# Patient Record
Sex: MALE | Race: BLACK OR AFRICAN AMERICAN | Employment: FULL TIME | URBAN - METROPOLITAN AREA
[De-identification: names, ages, dates, MRNs, and addresses within clinical notes are randomized per-mention and may not be internally consistent; named-entity substitution may affect disease eponyms.]

---

## 2017-03-14 ENCOUNTER — OFFICE VISIT (OUTPATIENT)
Dept: INTERNAL MEDICINE CLINIC | Age: 52
End: 2017-03-14

## 2017-03-14 VITALS
WEIGHT: 297 LBS | HEIGHT: 72 IN | DIASTOLIC BLOOD PRESSURE: 80 MMHG | BODY MASS INDEX: 40.23 KG/M2 | SYSTOLIC BLOOD PRESSURE: 124 MMHG | RESPIRATION RATE: 16 BRPM | HEART RATE: 64 BPM

## 2017-03-14 DIAGNOSIS — Z11.59 NEED FOR HEPATITIS C SCREENING TEST: ICD-10-CM

## 2017-03-14 DIAGNOSIS — Z00.00 ANNUAL PHYSICAL EXAM: Primary | ICD-10-CM

## 2017-03-14 DIAGNOSIS — Z11.4 SCREENING FOR HIV (HUMAN IMMUNODEFICIENCY VIRUS): ICD-10-CM

## 2017-03-14 DIAGNOSIS — E66.01 MORBID OBESITY WITH BMI OF 40.0-44.9, ADULT (HCC): ICD-10-CM

## 2017-03-14 DIAGNOSIS — Z12.5 SCREENING FOR PROSTATE CANCER: ICD-10-CM

## 2017-03-14 DIAGNOSIS — R73.01 IMPAIRED FASTING GLUCOSE: ICD-10-CM

## 2017-03-14 LAB
A/G RATIO: 1.7 (ref 1.1–2.2)
ALBUMIN SERPL-MCNC: 4.5 G/DL (ref 3.4–5)
ALP BLD-CCNC: 73 U/L (ref 40–129)
ALT SERPL-CCNC: 20 U/L (ref 10–40)
ANION GAP SERPL CALCULATED.3IONS-SCNC: 14 MMOL/L (ref 3–16)
AST SERPL-CCNC: 14 U/L (ref 15–37)
BASOPHILS ABSOLUTE: 0 K/UL (ref 0–0.2)
BASOPHILS RELATIVE PERCENT: 0.7 %
BILIRUB SERPL-MCNC: 0.4 MG/DL (ref 0–1)
BUN BLDV-MCNC: 13 MG/DL (ref 7–20)
CALCIUM SERPL-MCNC: 9.6 MG/DL (ref 8.3–10.6)
CHLORIDE BLD-SCNC: 106 MMOL/L (ref 99–110)
CHOLESTEROL, TOTAL: 206 MG/DL (ref 0–199)
CO2: 25 MMOL/L (ref 21–32)
CREAT SERPL-MCNC: 1.2 MG/DL (ref 0.9–1.3)
EOSINOPHILS ABSOLUTE: 0.1 K/UL (ref 0–0.6)
EOSINOPHILS RELATIVE PERCENT: 1.5 %
GFR AFRICAN AMERICAN: >60
GFR NON-AFRICAN AMERICAN: >60
GLOBULIN: 2.6 G/DL
GLUCOSE BLD-MCNC: 109 MG/DL (ref 70–99)
HCT VFR BLD CALC: 47.5 % (ref 40.5–52.5)
HDLC SERPL-MCNC: 75 MG/DL (ref 40–60)
HEMOGLOBIN: 15.2 G/DL (ref 13.5–17.5)
HEPATITIS C ANTIBODY INTERPRETATION: NORMAL
LDL CHOLESTEROL CALCULATED: 116 MG/DL
LYMPHOCYTES ABSOLUTE: 1.5 K/UL (ref 1–5.1)
LYMPHOCYTES RELATIVE PERCENT: 35 %
MCH RBC QN AUTO: 26.3 PG (ref 26–34)
MCHC RBC AUTO-ENTMCNC: 32.1 G/DL (ref 31–36)
MCV RBC AUTO: 82.1 FL (ref 80–100)
MONOCYTES ABSOLUTE: 0.4 K/UL (ref 0–1.3)
MONOCYTES RELATIVE PERCENT: 10.4 %
NEUTROPHILS ABSOLUTE: 2.2 K/UL (ref 1.7–7.7)
NEUTROPHILS RELATIVE PERCENT: 52.4 %
PDW BLD-RTO: 15 % (ref 12.4–15.4)
PLATELET # BLD: 214 K/UL (ref 135–450)
PMV BLD AUTO: 8.1 FL (ref 5–10.5)
POTASSIUM SERPL-SCNC: 4.4 MMOL/L (ref 3.5–5.1)
PROSTATE SPECIFIC ANTIGEN: 0.85 NG/ML (ref 0–4)
RBC # BLD: 5.79 M/UL (ref 4.2–5.9)
SODIUM BLD-SCNC: 145 MMOL/L (ref 136–145)
TOTAL PROTEIN: 7.1 G/DL (ref 6.4–8.2)
TRIGL SERPL-MCNC: 74 MG/DL (ref 0–150)
TSH SERPL DL<=0.05 MIU/L-ACNC: 0.49 UIU/ML (ref 0.27–4.2)
VLDLC SERPL CALC-MCNC: 15 MG/DL
WBC # BLD: 4.2 K/UL (ref 4–11)

## 2017-03-14 PROCEDURE — 99396 PREV VISIT EST AGE 40-64: CPT | Performed by: INTERNAL MEDICINE

## 2017-03-14 PROCEDURE — 93000 ELECTROCARDIOGRAM COMPLETE: CPT | Performed by: INTERNAL MEDICINE

## 2017-03-15 LAB
ESTIMATED AVERAGE GLUCOSE: 134.1 MG/DL
HBA1C MFR BLD: 6.3 %
HIV-1 AND HIV-2 ANTIBODIES: NORMAL

## 2017-03-20 ENCOUNTER — TELEPHONE (OUTPATIENT)
Dept: INTERNAL MEDICINE CLINIC | Age: 52
End: 2017-03-20

## 2017-03-28 ENCOUNTER — TELEPHONE (OUTPATIENT)
Dept: INTERNAL MEDICINE CLINIC | Age: 52
End: 2017-03-28

## 2018-03-19 ENCOUNTER — OFFICE VISIT (OUTPATIENT)
Dept: INTERNAL MEDICINE CLINIC | Age: 53
End: 2018-03-19

## 2018-03-19 VITALS
HEIGHT: 71 IN | WEIGHT: 308 LBS | BODY MASS INDEX: 43.12 KG/M2 | DIASTOLIC BLOOD PRESSURE: 80 MMHG | RESPIRATION RATE: 12 BRPM | SYSTOLIC BLOOD PRESSURE: 120 MMHG | HEART RATE: 84 BPM

## 2018-03-19 DIAGNOSIS — Z82.49 FAMILY HISTORY OF EARLY CAD: ICD-10-CM

## 2018-03-19 DIAGNOSIS — R05.9 COUGH: ICD-10-CM

## 2018-03-19 DIAGNOSIS — Z00.00 ANNUAL PHYSICAL EXAM: Primary | ICD-10-CM

## 2018-03-19 DIAGNOSIS — Z13.6 SCREENING FOR CARDIOVASCULAR CONDITION: ICD-10-CM

## 2018-03-19 DIAGNOSIS — E66.01 MORBID OBESITY WITH BMI OF 40.0-44.9, ADULT (HCC): ICD-10-CM

## 2018-03-19 DIAGNOSIS — R73.01 IMPAIRED FASTING GLUCOSE: ICD-10-CM

## 2018-03-19 DIAGNOSIS — J45.998 POST-VIRAL REACTIVE AIRWAY DISEASE: ICD-10-CM

## 2018-03-19 DIAGNOSIS — E66.01 OBESITY, MORBID, BMI 40.0-49.9 (HCC): ICD-10-CM

## 2018-03-19 DIAGNOSIS — Z12.5 SCREENING FOR PROSTATE CANCER: ICD-10-CM

## 2018-03-19 LAB
A/G RATIO: 1.8 (ref 1.1–2.2)
ALBUMIN SERPL-MCNC: 4.6 G/DL (ref 3.4–5)
ALP BLD-CCNC: 69 U/L (ref 40–129)
ALT SERPL-CCNC: 24 U/L (ref 10–40)
ANION GAP SERPL CALCULATED.3IONS-SCNC: 15 MMOL/L (ref 3–16)
AST SERPL-CCNC: 22 U/L (ref 15–37)
BASOPHILS ABSOLUTE: 0 K/UL (ref 0–0.2)
BASOPHILS RELATIVE PERCENT: 0.6 %
BILIRUB SERPL-MCNC: 0.5 MG/DL (ref 0–1)
BILIRUBIN, POC: NORMAL
BLOOD URINE, POC: NORMAL
BUN BLDV-MCNC: 15 MG/DL (ref 7–20)
CALCIUM SERPL-MCNC: 9.5 MG/DL (ref 8.3–10.6)
CHLORIDE BLD-SCNC: 100 MMOL/L (ref 99–110)
CHOLESTEROL, TOTAL: 211 MG/DL (ref 0–199)
CLARITY, POC: CLEAR
CO2: 27 MMOL/L (ref 21–32)
COLOR, POC: YELLOW
CREAT SERPL-MCNC: 1.1 MG/DL (ref 0.9–1.3)
EOSINOPHILS ABSOLUTE: 0.1 K/UL (ref 0–0.6)
EOSINOPHILS RELATIVE PERCENT: 2.6 %
GFR AFRICAN AMERICAN: >60
GFR NON-AFRICAN AMERICAN: >60
GLOBULIN: 2.5 G/DL
GLUCOSE BLD-MCNC: 120 MG/DL (ref 70–99)
GLUCOSE URINE, POC: NORMAL
HCT VFR BLD CALC: 46.4 % (ref 40.5–52.5)
HDLC SERPL-MCNC: 74 MG/DL (ref 40–60)
HEMOGLOBIN: 15.1 G/DL (ref 13.5–17.5)
KETONES, POC: NORMAL
LDL CHOLESTEROL CALCULATED: 117 MG/DL
LEUKOCYTE EST, POC: NORMAL
LYMPHOCYTES ABSOLUTE: 1.2 K/UL (ref 1–5.1)
LYMPHOCYTES RELATIVE PERCENT: 30.1 %
MCH RBC QN AUTO: 26.3 PG (ref 26–34)
MCHC RBC AUTO-ENTMCNC: 32.5 G/DL (ref 31–36)
MCV RBC AUTO: 81.1 FL (ref 80–100)
MONOCYTES ABSOLUTE: 0.5 K/UL (ref 0–1.3)
MONOCYTES RELATIVE PERCENT: 13.1 %
NEUTROPHILS ABSOLUTE: 2.2 K/UL (ref 1.7–7.7)
NEUTROPHILS RELATIVE PERCENT: 53.6 %
NITRITE, POC: NORMAL
PDW BLD-RTO: 15 % (ref 12.4–15.4)
PH, POC: 5
PLATELET # BLD: 204 K/UL (ref 135–450)
PMV BLD AUTO: 7.9 FL (ref 5–10.5)
POTASSIUM SERPL-SCNC: 4.3 MMOL/L (ref 3.5–5.1)
PROSTATE SPECIFIC ANTIGEN: 1.03 NG/ML (ref 0–4)
PROTEIN, POC: NORMAL
RBC # BLD: 5.73 M/UL (ref 4.2–5.9)
SODIUM BLD-SCNC: 142 MMOL/L (ref 136–145)
SPECIFIC GRAVITY, POC: 1.02
TOTAL PROTEIN: 7.1 G/DL (ref 6.4–8.2)
TRIGL SERPL-MCNC: 101 MG/DL (ref 0–150)
TSH SERPL DL<=0.05 MIU/L-ACNC: 0.66 UIU/ML (ref 0.27–4.2)
UROBILINOGEN, POC: 0.2
VLDLC SERPL CALC-MCNC: 20 MG/DL
WBC # BLD: 4.2 K/UL (ref 4–11)

## 2018-03-19 PROCEDURE — 81002 URINALYSIS NONAUTO W/O SCOPE: CPT | Performed by: INTERNAL MEDICINE

## 2018-03-19 PROCEDURE — 93000 ELECTROCARDIOGRAM COMPLETE: CPT | Performed by: INTERNAL MEDICINE

## 2018-03-19 PROCEDURE — 99396 PREV VISIT EST AGE 40-64: CPT | Performed by: INTERNAL MEDICINE

## 2018-03-19 RX ORDER — FLUTICASONE FUROATE AND VILANTEROL 200; 25 UG/1; UG/1
1 POWDER RESPIRATORY (INHALATION) DAILY
Qty: 1 EACH | Refills: 0 | COMMUNITY
Start: 2018-03-19 | End: 2018-05-10 | Stop reason: ALTCHOICE

## 2018-03-19 RX ORDER — BENZONATATE 200 MG/1
200 CAPSULE ORAL 3 TIMES DAILY PRN
Qty: 30 CAPSULE | Refills: 0 | Status: SHIPPED | OUTPATIENT
Start: 2018-03-19 | End: 2018-05-10 | Stop reason: ALTCHOICE

## 2018-03-19 ASSESSMENT — PATIENT HEALTH QUESTIONNAIRE - PHQ9
SUM OF ALL RESPONSES TO PHQ QUESTIONS 1-9: 0
2. FEELING DOWN, DEPRESSED OR HOPELESS: 0
1. LITTLE INTEREST OR PLEASURE IN DOING THINGS: 0
SUM OF ALL RESPONSES TO PHQ9 QUESTIONS 1 & 2: 0

## 2018-03-19 NOTE — PROGRESS NOTES
2 - 2 Dose Series) 10/17/2014    A1C test (Diabetic or Prediabetic)  03/14/2018    Lipid screen  03/14/2022    DTaP/Tdap/Td vaccine (2 - Td) 01/29/2023    Colon cancer screen colonoscopy  04/15/2024    Flu vaccine  Completed    Hepatitis C screen  Completed    HIV screen  Completed      Self-testicular exams: Yes  Sexual activity: single partner, wife. Last eye exam: 12/2017, glasses. Exercise: Working out less, 3 days per week. DIET: Salad for lunch, \"sensisble dinner. \"    Seatbelt use: Yes  Sunscreen Use: Yes  Dentist:  Every 6 months    Physical Exam    /80   Pulse 84   Resp 12   Ht 5' 11\" (1.803 m)   Wt (!) 308 lb (139.7 kg)   BMI 42.96 kg/m²     Wt Readings from Last 3 Encounters:   03/19/18 (!) 308 lb (139.7 kg)   03/14/17 297 lb (134.7 kg)   02/09/16 296 lb (134.3 kg)       BP Readings from Last 3 Encounters:   03/19/18 120/80   03/14/17 124/80   02/09/16 124/90         GEN:  47 y.o. male who is in NAD, A&O. He appears stated age and well nourished. He is cooperative and pleasant. Morbidly obese  HEAD:  NC/AT, no lesions. EYES:  JERMAINE, EOMI, No scleral icterus or conjunctival injection or discharge. Visual fields in tact to confrontation. Fundoscopic (non-dilated) grossly normal.  Disc margins well demarcated. EARS:  EAC's clear, TM's normal.  NOSE:  Nasal cavity is clear. No mucosal congestion or discharge. Sinuses are nontender. MOUTH & THROAT:  Oral cavity is clear without mucosal lesions. Tongue is midline. Dentition is in good repair. No pharyngeal erythema or exudate. NECK:  Supple. Full ROM. Trachea is midline. No increased JVD. No thyromegaly or nodules. No masses  LYMPH: No C/SC/A/F nodes  CARDIAC:  S1S2 NL. Regular rhythm. No murmur/clicks/rubs. No ectopy. PMI is non-displaced. VASC:  Pedal pulses 2/4. Carotid upstrokes 2+. No bruits noted. PULM:  Lungs are CTA. Symmetric breath sounds noted. AP Diameter NL.   No wheezing or forced expiratory wheezing. GI:  Abdomen is soft and nontender. No distension. No organomegaly. No masses. No pulsatile masses. :   External genitalia NL. No testicular or scrotal masses. Penile shaft is NL.  BREAST:  No masses. EXT:  No Cyanosis or clubbing. No edema. SKIN: Warm and dry, normal turgor, + Psoriatic plaques BL knees, elbows  NEURO:  Cranial nerves 2-12 are NL. Speech fluent and coherent. Strength is 5/5 in all muscle groups. No sensory deficits. No focal or lateralizing deficits. Reflexes 2/4 and symmetric. Gait is normal.  MS:  No C/T/L paraspinal tenderness. No scoliosis. No joint effusions. Full joint ROM. PSYCH:  Mood and affect NL. Judgement and insight NL. Assessment/Plan:  Ned was seen today for annual exam.    Diagnoses and all orders for this visit:    Annual physical exam  -     EKG 12 Lead  -     POCT Urinalysis no Micro  -     CBC Auto Differential  -     Comprehensive Metabolic Panel  -     Lipid Panel  -     TSH without Reflex  -     Hemoglobin A1C  -     Psa screening    Impaired fasting glucose  -     Comprehensive Metabolic Panel  -     Hemoglobin A1C    Morbid obesity with BMI of 40.0-44.9, adult (HCC)    Family history of early CAD  -     CT Cardiac Calcium Scoring; Future    Screening for cardiovascular condition  -     CT Cardiac Calcium Scoring; Future    Cough  -     benzonatate (TESSALON) 200 MG capsule; Take 1 capsule by mouth 3 times daily as needed for Cough    Post-viral reactive airway disease  -     Fluticasone Furoate-Vilanterol (BREO ELLIPTA) 200-25 MCG/INH AEPB;  Inhale 1 puff into the lungs daily    Obesity, morbid, BMI 40.0-49.9 ([facility] [address]    Screening for prostate cancer  -     Psa screening           Preventive plan of care for Ayan Madison        3/19/2018           Preventive Measures Status       Recommendations for screening   Prostate Cancer Screen  Lab Results   Component Value Date    PSA 0.85 03/14/2017     Test recommended and ordered    Colon Cancer Screen  Last colonoscopy: 4/15/14  Repeat every 10 years-- Due 2024   Diabetes Screen  Glucose (mg/dL)   Date Value   03/14/2017 109 (H)    Test recommended and ordered   Cholesterol Screen  Lab Results   Component Value Date    CHOL 206 (H) 03/14/2017    TRIG 74 03/14/2017    HDL 75 (H) 03/14/2017    LDLCALC 116 (H) 03/14/2017    Test recommended and ordered   HIV screening recommended for those ages 12-76 NO MATTER THE RISK FOR HIV--  3/14/2017  No need to repeat at this time    Hepatitis C screening recommended for those born between [facility]-- 3/14/2017  No need to repeat at this time    Aspirin for Cardiovascular Prevention   Yes Continue Baby Aspirin Daily     Recommended Immunizations    Immunization History   Administered Date(s) Administered    Influenza Virus Vaccine 02/09/2016, 12/13/2017    Tdap (Boostrix, Adacel) 01/29/2013    Influenza vaccine: recommended every fall-Up to date    Pneumonia vaccine: due at age 72     Shingles vaccine: Shingrx due. Prescription provided. Get 1 shot and then repeat 2-6 months later    Tetanus vaccine: tetanus and diptheria vaccine (Td/Tdap) recommended every 10 years- Td due 2023              1. Use sunscreen daily to help reduce the risk of skin cancer. 2. Have an eye exam every 2 years to screen for glaucoma, macular degeneration, and cataracts  3. Always wear a seatbelt while in a car  4. Wear a helmet if riding a bike or motorcycle  5. Continue aggressive lifestyle modification focusing on carbohydrate restriction, low-fat food choices and portion control. Continue exercise program.   6. Obtain coronary calcium score for screening for cardiovascular disease. 7. Return to the office if cough persists    Here are a few  Reliable websites with a variety of health and wellness information:   www.mylifecheck. heart. org     www.nutritionsource. org     www. americanheart. org     www. diabetes. org     www.[facility]     www.[employer] ([facility])

## 2018-03-19 NOTE — PATIENT INSTRUCTIONS
Preventive plan of care for Jes Brody        3/19/2018           Preventive Measures Status       Recommendations for screening   Prostate Cancer Screen  Lab Results   Component Value Date    PSA 0.85 03/14/2017     Test recommended and ordered    Colon Cancer Screen  Last colonoscopy: 4/15/14  Repeat every 10 years-- Due 2024   Diabetes Screen  Glucose (mg/dL)   Date Value   03/14/2017 109 (H)    Test recommended and ordered   Cholesterol Screen  Lab Results   Component Value Date    CHOL 206 (H) 03/14/2017    TRIG 74 03/14/2017    HDL 75 (H) 03/14/2017    LDLCALC 116 (H) 03/14/2017    Test recommended and ordered   HIV screening recommended for those ages 12-76 NO MATTER THE RISK FOR HIV--  3/14/2017  No need to repeat at this time    Hepatitis C screening recommended for those born between [facility]-- 3/14/2017  No need to repeat at this time    Aspirin for Cardiovascular Prevention   Yes Continue Baby Aspirin Daily     Recommended Immunizations    Immunization History   Administered Date(s) Administered    Influenza Virus Vaccine 02/09/2016, 12/13/2017    Tdap (Boostrix, Adacel) 01/29/2013    Influenza vaccine: recommended every fall-Up to date    Pneumonia vaccine: due at age 72     Shingles vaccine: Shingrx due. Prescription provided. Get 1 shot and then repeat 2-6 months later    Tetanus vaccine: tetanus and diptheria vaccine (Td/Tdap) recommended every 10 years- Td due 2023              1. Use sunscreen daily to help reduce the risk of skin cancer. 2. Have an eye exam every 2 years to screen for glaucoma, macular degeneration, and cataracts  3. Always wear a seatbelt while in a car  4. Wear a helmet if riding a bike or motorcycle  5. Continue aggressive lifestyle modification focusing on carbohydrate restriction, low-fat food choices and portion control. Continue exercise program.   6. Obtain coronary calcium score for screening for cardiovascular disease.     7. Return to the office if cough persists      Here are a few  Reliable websites with a variety of health and wellness information:   www.mylifecheck. heart. org     www.nutritionsource. org     www. americanheart. org     www. diabetes. org     www.[facility]     www.[employer] ([address] [facility] site)            Patient Education        Cough: Care Instructions  Your Care Instructions    A cough is your body's response to something that bothers your throat or airways. Many things can cause a cough. You might cough because of a cold or the flu, bronchitis, or asthma. Smoking, postnasal drip, allergies, and stomach acid that backs up into your throat also can cause coughs. A cough is a symptom, not a disease. Most coughs stop when the cause, such as a cold, goes away. You can take a few steps at home to cough less and feel better. Follow-up care is a key part of your treatment and safety. Be sure to make and go to all appointments, and call your doctor if you are having problems. It's also a good idea to know your test results and keep a list of the medicines you take. How can you care for yourself at home? · Drink lots of water and other fluids. This helps thin the mucus and soothes a dry or sore throat. Honey or lemon juice in hot water or tea may ease a dry cough. · Take cough medicine as directed by your doctor. · Prop up your head on pillows to help you breathe and ease a dry cough. · Try cough drops to soothe a dry or sore throat. Cough drops don't stop a cough. Medicine-flavored cough drops are no better than candy-flavored drops or hard candy. · Do not smoke. Avoid secondhand smoke. If you need help quitting, talk to your doctor about stop-smoking programs and medicines. These can increase your chances of quitting for good. When should you call for help? Call 911 anytime you think you may need emergency care. For example, call if:  ? · You have severe trouble breathing.    ?Call your doctor now or seek immediate medical care if:  ? · You cough up blood. ? · You have new or worse trouble breathing. ? · You have a new or higher fever. ? · You have a new rash. ? Watch closely for changes in your health, and be sure to contact your doctor if:  ? · You cough more deeply or more often, especially if you notice more mucus or a change in the color of your mucus. ? · You have new symptoms, such as a sore throat, an earache, or sinus pain. ? · You do not get better as expected. Where can you learn more? Go to https://Senchapepiceweb.Blue Tiger Labs. org and sign in to your [employer] account. Enter D279 in the Wedding Spot box to learn more about \"Cough: Care Instructions. \"     If you do not have an account, please click on the \"Sign Up Now\" link. Current as of: May 12, 2017  Content Version: 11.5  © 9005-6604 [employer]. Care instructions adapted under license by [employer]. If you have questions about a medical condition or this instruction, always ask your healthcare professional. [address] any warranty or liability for your use of this information. Patient Education        Prediabetes: Care Instructions  Your Care Instructions    Prediabetes is a warning sign that you are at risk for getting type 2 diabetes. It means that your blood sugar is higher than it should be. The food you eat turns into sugar, which your body uses for energy. Normally, an organ called the pancreas makes insulin, which allows the sugar in your blood to get into your body's cells. But when your body can't use insulin the right way, the sugar doesn't move into cells. It stays in your blood instead. This is called insulin resistance. The buildup of sugar in the blood causes prediabetes. The good news is that lifestyle changes may help you get your blood sugar back to normal and help you avoid or delay diabetes. Follow-up care is a key part of your treatment and safety.  Be sure to make and go to all appointments, and call your doctor if you are having problems. It's also a good idea to know your test results and keep a list of the medicines you take. How can you care for yourself at home? · Watch your weight. A healthy weight helps your body use insulin properly. · Limit the amount of calories, sweets, and unhealthy fat you eat. Ask your doctor if you should see a dietitian. A registered dietitian can help you create meal plans that fit your lifestyle. · Get at least 30 minutes of exercise on most days of the week. Exercise helps control your blood sugar. It also helps you maintain a healthy weight. Walking is a good choice. You also may want to do other activities, such as running, swimming, cycling, or playing tennis or team sports. · Do not smoke. Smoking can make prediabetes worse. If you need help quitting, talk to your doctor about stop-smoking programs and medicines. These can increase your chances of quitting for good. · If your doctor prescribed medicines, take them exactly as prescribed. Call your doctor if you think you are having a problem with your medicine. You will get more details on the specific medicines your doctor prescribes. When should you call for help? Watch closely for changes in your health, and be sure to contact your doctor if:  ? · You have any symptoms of diabetes. These may include:  ¨ Being thirsty more often. ¨ Urinating more. ¨ Being hungrier. ¨ Losing weight. ¨ Being very tired. ¨ Having blurry vision. ? · You have a wound that will not heal.   ? · You have an infection that will not go away. ? · You have problems with your blood pressure. ? · You want more information about diabetes and how you can keep from getting it. Where can you learn more? Go to https://nghia.Software 2000. org and sign in to your [employer] account. Enter I222 in the Equidate box to learn more about \"Prediabetes: Care Instructions. \"     If you do not have an account, please click on the \"Sign Up Now\" link. Current as of: March 13, 2017  Content Version: 11.5  © [id number] [employer]. Care instructions adapted under license by [facility] ([facility]). If you have questions about a medical condition or this instruction, always ask your healthcare professional. [address] any warranty or liability for your use of this information. Patient Education        Learning About Low-Carbohydrate Diets for Weight Loss  What is a low-carbohydrate diet? Low-carb diets avoid foods that are high in carbohydrate. These high-carb foods include pasta, bread, rice, cereal, fruits, and starchy vegetables. Instead, these diets usually have you eat foods that are high in fat and protein. Many people lose weight quickly on a low-carb diet. But the early weight loss is water. People on this diet often gain the weight back after they start eating carbs again. Not all diet plans are safe or work well. A lot of the evidence shows that low-carb diets aren't healthy. That's because these diets often don't include healthy foods like fruits and vegetables. Losing weight safely means balancing protein, fat, and carbs with every meal and snack. And low-carb diets don't always provide the vitamins, minerals, and fiber you need. If you have a serious medical condition, talk to your doctor before you try any diet. These conditions include kidney disease, heart disease, type 2 diabetes, high cholesterol, and high blood pressure. If you are pregnant, it may not be safe for your baby if you are on a low-carb diet. How can you lose weight safely? You might have heard that a diet plan helped another person lose weight. But that doesn't mean that it will work for you. It is very hard to stay on a diet that includes lots of big changes in your eating habits.  If you want to get to a healthy weight and stay there, making healthy lifestyle changes will often work better than dieting. These steps can help. · Make a plan for change. Work with your doctor to create a plan that is right for you. · See a dietitian. He or she can show you how to make healthy changes in your eating habits. · Manage stress. If you have a lot of stress in your life, it can be hard to focus on making healthy changes to your daily habits. · Track your food and activity. You are likely to do better at losing weight if you keep track of what you eat and what you do. Follow-up care is a key part of your treatment and safety. Be sure to make and go to all appointments, and call your doctor if you are having problems. It's also a good idea to know your test results and keep a list of the medicines you take. Where can you learn more? Go to https://Fusepoint Managed Servicesjesenia.Evotec. org and sign in to your [employer] account. Enter A121 in the [employer] box to learn more about \"Learning About Low-Carbohydrate Diets for Weight Loss. \"     If you do not have an account, please click on the \"Sign Up Now\" link. Current as of: May 12, 2017  Content Version: 11.5  © 7214-4530 [employer]. Care instructions adapted under license by [facility] ([facility]). If you have questions about a medical condition or this instruction, always ask your healthcare professional. [address] any warranty or liability for your use of this information. Patient Education        Learning About Coronary Artery Disease (CAD)  What is coronary artery disease? Coronary artery disease (CAD) occurs when plaque builds up in the arteries that bring oxygen-rich blood to your heart. Plaque is a fatty substance made of cholesterol, calcium, and other substances in the blood. This process is called hardening of the arteries, or atherosclerosis. What happens when you have coronary artery disease? · Plaque may narrow the coronary arteries. Narrowed arteries cause poor blood flow.  This can lead to angina symptoms colon cancer at age 48. You may have one of several tests. Your doctor will tell you how often to have tests based on your age and risk. Risks include whether you already had a precancerous polyp removed from your colon or whether your parent, brother, sister, or child has had colon cancer. · Heart attack and stroke risk. At least every 4 to 6 years, you should have your risk for heart attack and stroke assessed. Your doctor uses factors such as your age, blood pressure, cholesterol, and whether you smoke or have diabetes to show what your risk for a heart attack or stroke is over the next 10 years. · Abdominal aortic aneurysm. Ask your doctor whether you should have a test to check for an aneurysm. You may need a test if you ever smoked or if your parent, brother, sister, or child has had an aneurysm. When should you call for help? Watch closely for changes in your health, and be sure to contact your doctor if you have any problems or symptoms that concern you. Where can you learn more? Go to https://Quantenna Communications.Summitour. org and sign in to your [employer] account. Enter H970 in the Solazyme box to learn more about \"Well Visit, Men 48 to 72: Care Instructions. \"     If you do not have an account, please click on the \"Sign Up Now\" link. Current as of: Eileen 10, 2017  Content Version: 11.5  © 9296-8095 Healthwise, Incorporated. Care instructions adapted under license by [facility] ([facility]). If you have questions about a medical condition or this instruction, always ask your healthcare professional. [place] [address] any warranty or liability for your use of this information. Patient Education        Starting a Weight Loss Plan: Care Instructions  Your Care Instructions    If you are thinking about losing weight, it can be hard to know where to start. Your doctor can help you set up a weight loss plan that best meets your needs.  You may want to take a class on nutrition or

## 2018-03-20 ENCOUNTER — TELEPHONE (OUTPATIENT)
Dept: INTERNAL MEDICINE CLINIC | Age: 53
End: 2018-03-20

## 2018-03-20 LAB
ESTIMATED AVERAGE GLUCOSE: 157.1 MG/DL
HBA1C MFR BLD: 7.1 %

## 2018-04-28 ENCOUNTER — HOSPITAL ENCOUNTER (OUTPATIENT)
Dept: CT IMAGING | Age: 53
Discharge: HOME | End: 2018-04-28
Attending: INTERNAL MEDICINE | Admitting: INTERNAL MEDICINE

## 2018-04-28 DIAGNOSIS — Z13.6 SCREENING FOR CARDIOVASCULAR CONDITION: ICD-10-CM

## 2018-04-28 DIAGNOSIS — Z82.49 FAMILY HISTORY OF ISCHEMIC HEART DISEASE AND OTHER DISEASES OF THE CIRCULATORY SYSTEM: ICD-10-CM

## 2018-04-28 DIAGNOSIS — Z82.49 FAMILY HISTORY OF EARLY CAD: ICD-10-CM

## 2018-05-10 ENCOUNTER — OFFICE VISIT (OUTPATIENT)
Dept: INTERNAL MEDICINE CLINIC | Age: 53
End: 2018-05-10

## 2018-05-10 VITALS
DIASTOLIC BLOOD PRESSURE: 84 MMHG | SYSTOLIC BLOOD PRESSURE: 138 MMHG | BODY MASS INDEX: 43.1 KG/M2 | WEIGHT: 309 LBS | HEART RATE: 78 BPM

## 2018-05-10 DIAGNOSIS — Z23 NEED FOR PNEUMOCOCCAL VACCINATION: ICD-10-CM

## 2018-05-10 DIAGNOSIS — E11.9 TYPE 2 DIABETES MELLITUS WITHOUT COMPLICATION, WITHOUT LONG-TERM CURRENT USE OF INSULIN (HCC): Primary | ICD-10-CM

## 2018-05-10 LAB — GLUCOSE BLD-MCNC: 112 MG/DL

## 2018-05-10 PROCEDURE — 82962 GLUCOSE BLOOD TEST: CPT | Performed by: INTERNAL MEDICINE

## 2018-05-10 PROCEDURE — 90732 PPSV23 VACC 2 YRS+ SUBQ/IM: CPT | Performed by: INTERNAL MEDICINE

## 2018-05-10 PROCEDURE — 99213 OFFICE O/P EST LOW 20 MIN: CPT | Performed by: INTERNAL MEDICINE

## 2018-05-10 PROCEDURE — 90471 IMMUNIZATION ADMIN: CPT | Performed by: INTERNAL MEDICINE

## 2018-05-11 LAB
CREATININE URINE: 100 MG/DL (ref 39–259)
MICROALBUMIN UR-MCNC: <1.2 MG/DL
MICROALBUMIN/CREAT UR-RTO: NORMAL MG/G (ref 0–30)

## 2018-06-01 ENCOUNTER — OFFICE VISIT (OUTPATIENT)
Dept: INTERNAL MEDICINE CLINIC | Age: 53
End: 2018-06-01

## 2018-06-01 VITALS
SYSTOLIC BLOOD PRESSURE: 120 MMHG | HEART RATE: 84 BPM | WEIGHT: 304 LBS | RESPIRATION RATE: 16 BRPM | DIASTOLIC BLOOD PRESSURE: 80 MMHG | BODY MASS INDEX: 42.4 KG/M2

## 2018-06-01 DIAGNOSIS — E11.9 TYPE 2 DIABETES MELLITUS WITHOUT COMPLICATION, WITHOUT LONG-TERM CURRENT USE OF INSULIN (HCC): Primary | ICD-10-CM

## 2018-06-01 PROCEDURE — 99213 OFFICE O/P EST LOW 20 MIN: CPT | Performed by: INTERNAL MEDICINE

## 2018-06-21 ENCOUNTER — CARE COORDINATION (OUTPATIENT)
Dept: INTERNAL MEDICINE CLINIC | Age: 53
End: 2018-06-21

## 2018-07-06 ENCOUNTER — OFFICE VISIT (OUTPATIENT)
Dept: INTERNAL MEDICINE CLINIC | Age: 53
End: 2018-07-06

## 2018-07-06 VITALS
BODY MASS INDEX: 42.96 KG/M2 | SYSTOLIC BLOOD PRESSURE: 138 MMHG | DIASTOLIC BLOOD PRESSURE: 80 MMHG | WEIGHT: 308 LBS | RESPIRATION RATE: 16 BRPM | HEART RATE: 72 BPM

## 2018-07-06 DIAGNOSIS — Z23 NEED FOR SHINGLES VACCINE: ICD-10-CM

## 2018-07-06 DIAGNOSIS — E11.9 TYPE 2 DIABETES MELLITUS WITHOUT COMPLICATION, WITHOUT LONG-TERM CURRENT USE OF INSULIN (HCC): Primary | ICD-10-CM

## 2018-07-06 DIAGNOSIS — E66.01 MORBID OBESITY WITH BMI OF 40.0-44.9, ADULT (HCC): ICD-10-CM

## 2018-07-06 DIAGNOSIS — E78.5 HYPERLIPIDEMIA, UNSPECIFIED HYPERLIPIDEMIA TYPE: ICD-10-CM

## 2018-07-06 LAB
A/G RATIO: 1.7 (ref 1.1–2.2)
ALBUMIN SERPL-MCNC: 4.5 G/DL (ref 3.4–5)
ALP BLD-CCNC: 70 U/L (ref 40–129)
ALT SERPL-CCNC: 17 U/L (ref 10–40)
ANION GAP SERPL CALCULATED.3IONS-SCNC: 14 MMOL/L (ref 3–16)
AST SERPL-CCNC: 14 U/L (ref 15–37)
BILIRUB SERPL-MCNC: 0.3 MG/DL (ref 0–1)
BUN BLDV-MCNC: 16 MG/DL (ref 7–20)
CALCIUM SERPL-MCNC: 9.9 MG/DL (ref 8.3–10.6)
CHLORIDE BLD-SCNC: 105 MMOL/L (ref 99–110)
CHOLESTEROL, TOTAL: 195 MG/DL (ref 0–199)
CO2: 26 MMOL/L (ref 21–32)
CREAT SERPL-MCNC: 1.3 MG/DL (ref 0.9–1.3)
GFR AFRICAN AMERICAN: >60
GFR NON-AFRICAN AMERICAN: 58
GLOBULIN: 2.7 G/DL
GLUCOSE BLD-MCNC: 115 MG/DL (ref 70–99)
HDLC SERPL-MCNC: 72 MG/DL (ref 40–60)
LDL CHOLESTEROL CALCULATED: 107 MG/DL
POTASSIUM SERPL-SCNC: 4.2 MMOL/L (ref 3.5–5.1)
SODIUM BLD-SCNC: 145 MMOL/L (ref 136–145)
TOTAL PROTEIN: 7.2 G/DL (ref 6.4–8.2)
TRIGL SERPL-MCNC: 79 MG/DL (ref 0–150)
VLDLC SERPL CALC-MCNC: 16 MG/DL

## 2018-07-06 PROCEDURE — 99214 OFFICE O/P EST MOD 30 MIN: CPT | Performed by: INTERNAL MEDICINE

## 2018-07-06 NOTE — PROGRESS NOTES
[facility]  Internal Medicine  Patient Encounter  Candi Miranda D.O., [facility]        Chief Complaint   Patient presents with    Follow-up       HPI: 47 y.o. male seen today for routine check up regarding chronic medical problems and med review. Diabetes Mellitus Type II, Follow-up--   Lab Results   Component Value Date    LABA1C 7.1 03/19/2018      Lab Results   Component Value Date    .1 03/19/2018     He feels his sugars are good. Home sugars reviewed--excellent. He has been making some diet changes. He eliminated juices. He has not lost any weight. Last Eye Exam: Scheduled for August.   U.Microalbumin/Cr:  5/10/2018   Pt is not on ACEI or ARB. Pt denies foot ulcerations, increase appetite, nausea, paresthesia of the feet, polydipsia, polyuria and visual disturbances. Insulin Treated? No.    ASA: Yes. Tobacco: No     Hyperlipidemia:    Lab Results   Component Value Date    LDLCALC 117 (H) 03/19/2018   He has not yet started exercising. He does not eat a lot of fatty foods, but cheated over the July 4th holiday. Morbid obesity-- He has not lost weight. He is starting to watch his diet. Not much exercise. Past Medical History:   Diagnosis Date    HNP (herniated nucleus pulposus), cervical 2009    Dr. Corbin Hernadez Impaired fasting glucose     Psoriasis     Dr. Phuong Bosch    Type 2 diabetes mellitus without complication, without long-term current use of [facility] 5/10/2018         MEDICATIONS:  Prior to Visit Medications    Medication Sig Taking? Authorizing Provider   zoster recombinant adjuvanted vaccine (SHINGRIX) 50 MCG SUSR injection Inject 0.5 mLs into the muscle once for 1 dose . Repeat in 2-6 months Yes Walker Barron, DO   aspirin EC 81 MG EC tablet Take 1 tablet by mouth daily. Yes Candi Miranda, DO           Review of Systems - As per HPI  GEN: Pt denies fever, chills or night sweats. Denies weight changes.   Appetite good  HEENT: Pt loss and sustained weight loss. 4. Need for shingles vaccine    - zoster recombinant adjuvanted vaccine (SHINGRIX) 50 MCG SUSR injection; Inject 0.5 mLs into the muscle once for 1 dose . Repeat in 2-6 months  Dispense: 0.5 mL; Refill: 1        Discussed medications with patient who voiced understanding of their use, indication and potential side effects. Pt also understands the above recommendations. All questions answered.

## 2018-07-06 NOTE — PATIENT INSTRUCTIONS
when cooking. · Don't skip meals. Your blood sugar may drop too low if you skip meals and take insulin or certain medicines for diabetes. · Check with your doctor before you drink alcohol. Alcohol can cause your blood sugar to drop too low. Alcohol can also cause a bad reaction if you take certain diabetes medicines. Follow-up care is a key part of your treatment and safety. Be sure to make and go to all appointments, and call your doctor if you are having problems. It's also a good idea to know your test results and keep a list of the medicines you take. Where can you learn more? Go to https://chpepiceweb.[email or web address]. org and sign in to your Ctrip account. Enter B460 in the Outsmart box to learn more about \"Learning About Diabetes Food Guidelines. \"     If you do not have an account, please click on the \"Sign Up Now\" link. Current as of: December 7, 2017  Content Version: 11.6  © 3093-4256 [employer]. Care instructions adapted under license by [facility] ([facility]). If you have questions about a medical condition or this instruction, always ask your healthcare professional. [address] any warranty or liability for your use of this information. Patient Education        Learning About Meal Planning for Diabetes  Why plan your meals? Meal planning can be a key part of managing diabetes. Planning meals and snacks with the right balance of carbohydrate, protein, and fat can help you keep your blood sugar at the target level you set with your doctor. You don't have to eat special foods. You can eat what your family eats, including sweets once in a while. But you do have to pay attention to how often you eat and how much you eat of certain foods. You may want to work with a dietitian or a certified diabetes educator. He or she can give you tips and meal ideas and can answer your questions about meal planning.  This health professional can also help you reach blood sugar levels. A registered dietitian or diabetes educator can help you plan how much carbohydrate to include in each meal and snack. A guideline for your daily amount of carbohydrate is:  · 45 to 60 grams at each meal. That's about the same as 3 to 4 carbohydrate servings. · 15 to 20 grams at each snack. That's about the same as 1 carbohydrate serving. The Nutrition Facts label on packaged foods tells you how much carbohydrate is in a serving of the food. First, look at the serving size on the food label. Is that the amount you eat in a serving? All of the nutrition information on a food label is based on that serving size. So if you eat more or less than that, you'll need to adjust the other numbers. Total carbohydrate is the next thing you need to look for on the label. If you count carbohydrate servings, one serving of carbohydrate is 15 grams. For foods that don't come with labels, such as fresh fruits and vegetables, you'll need a guide that lists carbohydrate in these foods. Ask your doctor, dietitian, or diabetes educator about books or other nutrition guides you can use. If you take insulin, you need to know how many grams of carbohydrate are in a meal. This lets you know how much rapid-acting insulin to take before you eat. If you use an insulin pump, you get a constant rate of insulin during the day. So the pump must be programmed at meals to give you extra insulin to cover the rise in blood sugar after meals. When you know how much carbohydrate you will eat, you can take the right amount of insulin. Or, if you always use the same amount of insulin, you need to make sure that you eat the same amount of carbohydrate at meals. If you need more help to understand carbohydrate counting and food labels, ask your doctor, dietitian, or diabetes educator. How do you get started with meal planning? Here are some tips to get started:  · Plan your meals a week at a time.  Don't forget to include snacks make and go to all appointments, and call your doctor if you are having problems. It's also a good idea to know your test results and keep a list of the medicines you take. Where can you learn more? Go to https://chjesenia.Kitchenbug. org and sign in to your Options Away account. Enter B306 in the Matomy Money box to learn more about \"Learning About High Cholesterol. \"     If you do not have an account, please click on the \"Sign Up Now\" link. Current as of: May 10, 2017  Content Version: 11.6  © 4816-6109 [employer]. Care instructions adapted under license by [facility] ([facility]). If you have questions about a medical condition or this instruction, always ask your healthcare professional. [address] any warranty or liability for your use of this information.

## 2018-07-07 LAB
ESTIMATED AVERAGE GLUCOSE: 139.9 MG/DL
HBA1C MFR BLD: 6.5 %

## 2018-07-10 ENCOUNTER — TELEPHONE (OUTPATIENT)
Dept: INTERNAL MEDICINE CLINIC | Age: 53
End: 2018-07-10

## 2018-07-10 NOTE — TELEPHONE ENCOUNTER
Patient states he is returning a call to the office regarding his lab results. Per Dr. Edward Yoon note, I informed patient of the following:    Notes recorded by Blima Apley, DO on 7/7/2018 at 9:23 AM EDT  Notify pt lab tests look good.  Sugar average is better. Juli Lay making changes and working hard with lifestyle modification    Patient voiced understanding and has No additional questions.     Phone Encounter Completed unless you need patient for something additional.

## 2018-10-15 ENCOUNTER — OFFICE VISIT (OUTPATIENT)
Dept: INTERNAL MEDICINE CLINIC | Age: 53
End: 2018-10-15
Payer: COMMERCIAL

## 2018-10-15 VITALS
SYSTOLIC BLOOD PRESSURE: 120 MMHG | DIASTOLIC BLOOD PRESSURE: 82 MMHG | HEART RATE: 92 BPM | HEIGHT: 71 IN | RESPIRATION RATE: 16 BRPM | WEIGHT: 309 LBS | OXYGEN SATURATION: 95 % | BODY MASS INDEX: 43.26 KG/M2

## 2018-10-15 DIAGNOSIS — E66.01 MORBID OBESITY WITH BMI OF 40.0-44.9, ADULT (HCC): ICD-10-CM

## 2018-10-15 DIAGNOSIS — R05.9 COUGH: ICD-10-CM

## 2018-10-15 DIAGNOSIS — E11.9 TYPE 2 DIABETES MELLITUS WITHOUT COMPLICATION, WITHOUT LONG-TERM CURRENT USE OF INSULIN (HCC): ICD-10-CM

## 2018-10-15 DIAGNOSIS — E11.9 TYPE 2 DIABETES MELLITUS WITHOUT COMPLICATION, WITHOUT LONG-TERM CURRENT USE OF INSULIN (HCC): Primary | ICD-10-CM

## 2018-10-15 DIAGNOSIS — E78.5 HYPERLIPIDEMIA, UNSPECIFIED HYPERLIPIDEMIA TYPE: ICD-10-CM

## 2018-10-15 DIAGNOSIS — J30.2 SEASONAL ALLERGIC RHINITIS, UNSPECIFIED TRIGGER: ICD-10-CM

## 2018-10-15 DIAGNOSIS — Z23 FLU VACCINE NEED: ICD-10-CM

## 2018-10-15 LAB
A/G RATIO: 1.6 (ref 1.1–2.2)
ALBUMIN SERPL-MCNC: 4.6 G/DL (ref 3.4–5)
ALP BLD-CCNC: 77 U/L (ref 40–129)
ALT SERPL-CCNC: 23 U/L (ref 10–40)
ANION GAP SERPL CALCULATED.3IONS-SCNC: 17 MMOL/L (ref 3–16)
AST SERPL-CCNC: 21 U/L (ref 15–37)
BILIRUB SERPL-MCNC: 0.5 MG/DL (ref 0–1)
BUN BLDV-MCNC: 15 MG/DL (ref 7–20)
CALCIUM SERPL-MCNC: 9.7 MG/DL (ref 8.3–10.6)
CHLORIDE BLD-SCNC: 103 MMOL/L (ref 99–110)
CHOLESTEROL, TOTAL: 183 MG/DL (ref 0–199)
CO2: 25 MMOL/L (ref 21–32)
CREAT SERPL-MCNC: 1.3 MG/DL (ref 0.9–1.3)
GFR AFRICAN AMERICAN: >60
GFR NON-AFRICAN AMERICAN: 58
GLOBULIN: 2.8 G/DL
GLUCOSE BLD-MCNC: 119 MG/DL (ref 70–99)
HDLC SERPL-MCNC: 70 MG/DL (ref 40–60)
LDL CHOLESTEROL CALCULATED: 98 MG/DL
POTASSIUM SERPL-SCNC: 4.4 MMOL/L (ref 3.5–5.1)
SODIUM BLD-SCNC: 145 MMOL/L (ref 136–145)
TOTAL PROTEIN: 7.4 G/DL (ref 6.4–8.2)
TRIGL SERPL-MCNC: 74 MG/DL (ref 0–150)
VLDLC SERPL CALC-MCNC: 15 MG/DL

## 2018-10-15 PROCEDURE — 90686 IIV4 VACC NO PRSV 0.5 ML IM: CPT | Performed by: INTERNAL MEDICINE

## 2018-10-15 PROCEDURE — 99214 OFFICE O/P EST MOD 30 MIN: CPT | Performed by: INTERNAL MEDICINE

## 2018-10-15 PROCEDURE — 90471 IMMUNIZATION ADMIN: CPT | Performed by: INTERNAL MEDICINE

## 2018-10-15 RX ORDER — TRIAMCINOLONE ACETONIDE 55 UG/1
2 SPRAY, METERED NASAL NIGHTLY
Qty: 1 INHALER | Refills: 3 | Status: SHIPPED | OUTPATIENT
Start: 2018-10-15 | End: 2019-02-15 | Stop reason: ALTCHOICE

## 2018-10-15 RX ORDER — CETIRIZINE HYDROCHLORIDE 10 MG/1
10 TABLET ORAL NIGHTLY
COMMUNITY
Start: 2018-10-15 | End: 2019-02-15 | Stop reason: ALTCHOICE

## 2018-10-15 NOTE — PROGRESS NOTES
[facility]) Physicians  Internal Medicine  Patient Encounter  Rafaela Stephen D.O., [facility]        Chief Complaint   Patient presents with    Medication Check       HPI: 47 y.o. male seen today for routine check up regarding chronic medical problems and med review. He states he has felt \"great. \"  However, he states he has a nagging dry cough. He has started Mucinex. He does have some runny nose, sneezing. He reports the cough starts as a tickle. He thinks he has drainage. Patient did have a similar problem back earlier in the year around the springtime. He did require an inhaler. Diabetes Mellitus Type II, Follow-up--   Lab Results   Component Value Date    LABA1C 6.5 07/06/2018      Lab Results   Component Value Date    .9 07/06/2018   A1c in March 2018 was 7.1%. He endorses no symptoms of glucose toxicity such as polyuria or polydipsia. He states he has been taking his blood sugars. He states he is still following diet changes-- He has cut back on bread and fried foods. However he's not been able to lose any additional weight. He never went to the diabetic education classes. He reports that this just did not fit into his schedule. He is also exercising. Last Eye Exam: Scheduled for August.    U.Microalbumin/Cr:  5/10/2018   Pt is not on ACEI or ARB. Complications-- none  Insulin Treated? No.    ASA: Yes. Tobacco: No     Hyperlipidemia:    Lab Results   Component Value Date    LDLCALC 107 (H) 07/06/2018   Patient is not yet on a statin. He has refused statin therapy despite understand the risk of CVD particularly in the setting of diabetes. He does try to stay away from fatty foods. Morbid obesity-- He is up 5#. He has not been successful. Pt has had a Coronary Calcium score which was Zero. Test was 4/28/2018.         Past Medical History:   Diagnosis Date    HNP (herniated nucleus pulposus), cervical 2009    Dr. Deanne Wilder Impaired fasting glucose    

## 2018-10-16 ENCOUNTER — TELEPHONE (OUTPATIENT)
Dept: FAMILY MEDICINE CLINIC | Age: 53
End: 2018-10-16

## 2018-10-16 LAB
ESTIMATED AVERAGE GLUCOSE: 148.5 MG/DL
HBA1C MFR BLD: 6.8 %

## 2018-11-06 ENCOUNTER — TELEPHONE (OUTPATIENT)
Dept: INTERNAL MEDICINE CLINIC | Age: 53
End: 2018-11-06

## 2019-02-15 ENCOUNTER — OFFICE VISIT (OUTPATIENT)
Dept: INTERNAL MEDICINE CLINIC | Age: 54
End: 2019-02-15
Payer: COMMERCIAL

## 2019-02-15 VITALS
DIASTOLIC BLOOD PRESSURE: 70 MMHG | SYSTOLIC BLOOD PRESSURE: 115 MMHG | BODY MASS INDEX: 43.54 KG/M2 | HEART RATE: 94 BPM | WEIGHT: 311 LBS | HEIGHT: 71 IN | RESPIRATION RATE: 12 BRPM

## 2019-02-15 DIAGNOSIS — E78.5 HYPERLIPIDEMIA, UNSPECIFIED HYPERLIPIDEMIA TYPE: ICD-10-CM

## 2019-02-15 DIAGNOSIS — E11.9 TYPE 2 DIABETES MELLITUS WITHOUT COMPLICATION, WITHOUT LONG-TERM CURRENT USE OF INSULIN (HCC): Primary | ICD-10-CM

## 2019-02-15 DIAGNOSIS — E66.01 MORBID OBESITY WITH BMI OF 40.0-44.9, ADULT (HCC): ICD-10-CM

## 2019-02-15 PROCEDURE — 99214 OFFICE O/P EST MOD 30 MIN: CPT | Performed by: INTERNAL MEDICINE

## 2019-02-15 ASSESSMENT — PATIENT HEALTH QUESTIONNAIRE - PHQ9
SUM OF ALL RESPONSES TO PHQ9 QUESTIONS 1 & 2: 0
2. FEELING DOWN, DEPRESSED OR HOPELESS: 0
SUM OF ALL RESPONSES TO PHQ QUESTIONS 1-9: 0
SUM OF ALL RESPONSES TO PHQ QUESTIONS 1-9: 0
1. LITTLE INTEREST OR PLEASURE IN DOING THINGS: 0

## 2019-02-18 ENCOUNTER — TELEPHONE (OUTPATIENT)
Dept: INTERNAL MEDICINE CLINIC | Age: 54
End: 2019-02-18

## 2019-08-29 ENCOUNTER — OFFICE VISIT (OUTPATIENT)
Dept: INTERNAL MEDICINE CLINIC | Age: 54
End: 2019-08-29
Payer: COMMERCIAL

## 2019-08-29 VITALS
OXYGEN SATURATION: 98 % | WEIGHT: 309 LBS | DIASTOLIC BLOOD PRESSURE: 84 MMHG | BODY MASS INDEX: 43.26 KG/M2 | SYSTOLIC BLOOD PRESSURE: 138 MMHG | HEART RATE: 88 BPM | HEIGHT: 71 IN

## 2019-08-29 DIAGNOSIS — Z82.49 FAMILY HISTORY OF EARLY CAD: ICD-10-CM

## 2019-08-29 DIAGNOSIS — E78.5 HYPERLIPIDEMIA, UNSPECIFIED HYPERLIPIDEMIA TYPE: ICD-10-CM

## 2019-08-29 DIAGNOSIS — R05.9 COUGH: ICD-10-CM

## 2019-08-29 DIAGNOSIS — Z00.00 ANNUAL PHYSICAL EXAM: Primary | ICD-10-CM

## 2019-08-29 DIAGNOSIS — E66.01 MORBID OBESITY WITH BMI OF 40.0-44.9, ADULT (HCC): ICD-10-CM

## 2019-08-29 DIAGNOSIS — Z12.5 SCREENING FOR PROSTATE CANCER: ICD-10-CM

## 2019-08-29 DIAGNOSIS — E11.9 TYPE 2 DIABETES MELLITUS WITHOUT COMPLICATION, WITHOUT LONG-TERM CURRENT USE OF INSULIN (HCC): ICD-10-CM

## 2019-08-29 LAB
CREATININE URINE: 201.7 MG/DL (ref 39–259)
MICROALBUMIN UR-MCNC: <1.2 MG/DL
MICROALBUMIN/CREAT UR-RTO: NORMAL MG/G (ref 0–30)

## 2019-08-29 PROCEDURE — 99396 PREV VISIT EST AGE 40-64: CPT | Performed by: INTERNAL MEDICINE

## 2019-08-29 PROCEDURE — 93000 ELECTROCARDIOGRAM COMPLETE: CPT | Performed by: INTERNAL MEDICINE

## 2019-08-29 NOTE — PATIENT INSTRUCTIONS
as an ACE inhibitor. A cough is a symptom, not a disease. To treat a chronic cough, you may need to treat the problem that causes it. You can take a few steps at home to cough less and feel better. Some people cough or clear their throat out of habit for no clear reason. Follow-up care is a key part of your treatment and safety. Be sure to make and go to all appointments, and call your doctor if you are having problems. It's also a good idea to know your test results and keep a list of the medicines you take. How can you care for yourself at home? · Drink plenty of water and other fluids. This may help soothe a dry or sore throat. Honey or lemon juice in hot water or tea may ease a dry cough. · Prop up your head on pillows to help you breathe and ease a cough. · Do not smoke or allow others to smoke around you. Smoke can make a cough worse. If you need help quitting, talk to your doctor about stop-smoking programs and medicines. These can increase your chances of quitting for good. · Avoid exposure to smoke, dust, or other pollutants, or wear a face mask. Check with your doctor or pharmacist to find out which type of face mask will give you the most benefit. · Take cough medicine as directed by your doctor. · Try cough drops to soothe a dry or sore throat. Cough drops don't stop a cough. Medicine-flavored cough drops are no better than candy-flavored drops or hard candy. Throat clearing  When you have a chronic cough or a disease that may cause this type of cough, you may often feel like you want to clear your throat. This helps bring up mucus. But throat clearing does not always have a cause. Throat clearing can become a habit. The more you do it, the more you feel like you need to do it. But frequent throat clearing can be hard on your vocal cords. It's like slamming them together. To help lessen throat clearing, you can try:  · Taking small sips of water.   · Not clearing your throat when you feel you need.  · Take the list to the store to do your weekly shopping. Follow-up care is a key part of your treatment and safety. Be sure to make and go to all appointments, and call your doctor if you are having problems. It's also a good idea to know your test results and keep a list of the medicines you take. Where can you learn more? Go to https://[email or web address].[email or web address] org and sign in to your Fair Observer account. Enter J957 in the Smithers Avanza box to learn more about \"Learning About Meal Planning for Diabetes. \"     If you do not have an account, please click on the \"Sign Up Now\" link. Current as of: July 25, 2018  Content Version: 12.1  © 7180-8009 Healthwise, [employer]. Care instructions adapted under license by [facility] ([facility]). If you have questions about a medical condition or this instruction, always ask your healthcare professional. [place] [address] any warranty or liability for your use of this information. Patient Education        Prostate Cancer Screening: Care Instructions  Your Care Instructions    The prostate gland is an organ found just below a man's bladder. It is the size and shape of a walnut. It surrounds the tube that carries urine from the bladder out of the body through the penis. This tube is called the urethra. Prostate cancer is the abnormal growth of cells in the prostate. It is the second most common type of cancer in men. (Skin cancer is the most common.)  Most cases of prostate cancer occur in men older than 72. The disease runs in families. And it's more common in -American men. When it's found and treated early, prostate cancer may be cured. But it is not always treated. This is because prostate cancer may not shorten your life, especially if you are older and the cancer is growing slowly. Follow-up care is a key part of your treatment and safety.  Be sure to make and go to all appointments, and call your doctor if you are having good.  · Protect your skin from too much sun. When you're outdoors from 10 a.m. to 4 p.m., stay in the shade or cover up with clothing and a hat with a wide brim. Wear sunglasses that block UV rays. Even when it's cloudy, put broad-spectrum sunscreen (SPF 30 or higher) on any exposed skin. · See a dentist one or two times a year for checkups and to have your teeth cleaned. · Wear a seat belt in the car. Follow your doctor's advice about when to have certain tests. These tests can spot problems early. · Cholesterol. Your doctor will tell you how often to have this done based on your overall health and other things that can increase your risk for heart attack and stroke. · Blood pressure. Have your blood pressure checked during a routine doctor visit. Your doctor will tell you how often to check your blood pressure based on your age, your blood pressure results, and other factors. · Prostate exam. Talk to your doctor about whether you should have a blood test (called a PSA test) for prostate cancer. Experts recommend that you discuss the benefits and risks of the test with your doctor before you decide whether to have this test.  · Diabetes. Ask your doctor whether you should have tests for diabetes. · Vision. Some experts recommend that you have yearly exams for glaucoma and other age-related eye problems starting at age 48. · Hearing. Tell your doctor if you notice any change in your hearing. You can have tests to find out how well you hear. · Colorectal cancer. Your risk for colorectal cancer gets higher as you get older. Some experts say that adults should start regular screening at age 48 and stop at age 76. Others say to start before age 48 or continue after age 76. Talk with your doctor about your risk and when to start and stop screening. · Heart attack and stroke risk. At least every 4 to 6 years, you should have your risk for heart attack and stroke assessed.  Your doctor uses factors such as your

## 2019-08-29 NOTE — PROGRESS NOTES
[address]  Physicians  Internal Medicine  Patient Encounter  Nag Rao D.O., [facility] Preventative Physical    Chief Complaint   Patient presents with    Annual Exam       HPI-- 46 y.o. male presents today for a complete annual physical.     Medical/Surgical Histories     Past Medical History:   Diagnosis Date    HNP (herniated nucleus pulposus), cervical 2009    Dr. Yadi Bowen Impaired fasting glucose     Psoriasis     Dr. Jody Beach    Type 2 diabetes mellitus without complication, without long-term current use of insulin ([facility] [address] 5/10/2018          No past surgical history on file. Medications/Allergies     Prior to Visit Medications    Medication Sig Taking? Authorizing Provider   aspirin EC 81 MG EC tablet Take 1 tablet by mouth daily. Yes Walker Barron, DO         No Known Allergies      Substance Use History     Social History     Tobacco Use    Smoking status: Never Smoker    Smokeless tobacco: Never Used   Substance Use Topics    Alcohol use: No    Drug use: No      [employer]-- Accounting    Family History     Family History   Problem Relation Age of Onset    High Blood Pressure Mother     Diabetes Mother     Diabetes Father     Heart Disease Father 77        CAD, CABG    High Blood Pressure Father     Kidney Disease Brother         stone    High Blood Pressure Brother     Obesity Brother     Heart Disease Brother         CHF    Heart Disease Brother 47        CAD, PTCA with stent, CHF    Diabetes Brother     Hypertension Brother               REVIEW OF SYSTEMS:    CONSTITUTIONAL:  Neg  fatigue, fever, chills or night sweats, anorexia, Sleep difficulties. No recent weight changes. No daytime sleepiness. EYES: Neg  Blurry vision, loss of vision, double vision, tearing, itching, eye pain. + Glasses. EARS:  Neg Hearing loss, tinnitus, vertigo, discharge or otalgia. NOSE:  Neg  itching, epistaxis, or snoring. No nasal symptoms. MOUTH/THROAT:  Neg Bleeding gums, hoarseness, sore throat, dysphagia, throat infections, or dentures. Tickle in throat preceding cough. RESPIRATORY:  Neg SOB ,wheeze, sputum, hemoptysis. No snoring. He is C/O cough again. Started again in May. The cough stopped in [place] and recurred when he came back to [place]. The cough seems to come and go. Certain times of the year, he may have coughing. Cough can worsen with laughing. He states he feels great now. CARDIOVASCULAR:  Neg Chest pain, palpitations, heart murmur, dyspnea on exertion, orthopnea, paroxysmal nocturnal dyspnea or edema of extremities, or claudication. Coronary Calcium Score 4/28/2019---> Zero  GASTROINTESTINAL:  Neg   Nausea, vomiting, or diarrhea, constipation,  hematemesis, heart burn, dysphagia,change in bowel movements or stool caliber, hematochezia, melena, abdominal pain, or food intolerance. Colonoscopy: 2015, 10 year recall. GENITOURINARY:  Neg  Urinary frequency, hesitancy, urgency, polyuria, dysuria, hematuria, nocturia, incontinence, change in stream, genital pain or swelling, kidney stones, STD's. VARSHA: Yes, PSA: Yes  HEMATOLOGIC/LYMPHATIC:  Neg  Anemia, bleeding dyscrasias, easy bruising, blood clots (DVT/PE), transfusions, or enlarged lymph nodes. MUSCULOSKELETAL:  Neg  New myalgias, joint pain, bone pain, joint swelling, low back pain, neck pain, radicular pain, or fractures. NEUROLOGICAL:  Neg  Loss of Consciousness, memeory loss or forgetfulness, confusion, difficulty concentrating, seizures, insomina, aphasia or dysarthria unilateral weakness or paresthesias, ataxia, headaches, syncope, tremor, or H/O head trauma. PSYCHIATRIC:  Neg  Depression, anxiety, personality changes, nervousness, drug or alcohol use/abuse, H/O psych counseling: No, Psychotropics: No  SKIN :  Neg  nail changes, sun burns, tattoos, change in moles, or skin color changes. Sees Dr. Joshua Burgos.    ENDOCRINE:  Neg midline. No increased JVD. No thyromegaly or nodules. No masses  LYMPH: No C/SC/A/F nodes  CARDIAC: Heart rhythm is regular. No murmurs. No ectopy. Rate is normal.  VASC:  Pedal pulses 2/4. Carotid upstrokes 2+. No bruits noted. PULM:  Lungs are CTA. Symmetric breath sounds noted. AP Diameter NL. No wheezing or forced expiratory wheezing. GI:  Abdomen is soft and nontender. No distension. No organomegaly. No masses. No pulsatile masses. :   External genitalia NL. No testicular or scrotal masses. Penile shaft is NL. No inguinal hernias  BREAST:  No masses. EXT:  No Cyanosis or clubbing. No edema. SKIN: Warm and dry, normal turgor, + Psoriatic plaques BL knees, elbows-- No change. He sees Dr. My Turpin. NEURO:  Cranial nerves 2-12 are NL. Speech fluent and coherent. Strength is 5/5 in all muscle groups. No sensory deficits. No focal or lateralizing deficits. Reflexes 2/4 and symmetric. Gait is normal.  MS:  No C/T/L paraspinal tenderness. No scoliosis. No joint effusions. Full joint ROM. PSYCH:  Mood and affect NL. Judgement and insight NL. Assessment/Plan:  Ned was seen today for annual exam.    Diagnoses and all orders for this visit:    Annual physical exam  -     CBC Auto Differential; Future  -     Comprehensive Metabolic Panel; Future  -     Lipid Panel; Future  -     TSH without Reflex; Future  -     Hemoglobin A1C; Future  -     Psa screening; Future    Family history of early CAD  -     EKG 12 lead    Type 2 diabetes mellitus without complication, without long-term current use of insulin ([facility])  -      DIABETES FOOT EXAM  -     Microalbumin / Creatinine Urine Ratio  -     Comprehensive Metabolic Panel; Future  -     Lipid Panel; Future  -     Hemoglobin A1C; Future    Hyperlipidemia, unspecified hyperlipidemia type  -     Comprehensive Metabolic Panel; Future  -     Lipid Panel; Future    Cough  --Etiology is unclear.   Differential would include

## 2019-09-06 ENCOUNTER — TELEPHONE (OUTPATIENT)
Dept: INTERNAL MEDICINE CLINIC | Age: 54
End: 2019-09-06

## 2020-07-30 ENCOUNTER — OFFICE VISIT (OUTPATIENT)
Dept: INTERNAL MEDICINE CLINIC | Age: 55
End: 2020-07-30
Payer: COMMERCIAL

## 2020-07-30 VITALS
SYSTOLIC BLOOD PRESSURE: 127 MMHG | HEIGHT: 72 IN | TEMPERATURE: 97.5 F | RESPIRATION RATE: 12 BRPM | BODY MASS INDEX: 41.45 KG/M2 | WEIGHT: 306 LBS | DIASTOLIC BLOOD PRESSURE: 87 MMHG | HEART RATE: 86 BPM

## 2020-07-30 DIAGNOSIS — E78.5 HYPERLIPIDEMIA, UNSPECIFIED HYPERLIPIDEMIA TYPE: ICD-10-CM

## 2020-07-30 DIAGNOSIS — Z12.5 SCREENING FOR PROSTATE CANCER: ICD-10-CM

## 2020-07-30 DIAGNOSIS — Z00.00 ANNUAL PHYSICAL EXAM: ICD-10-CM

## 2020-07-30 DIAGNOSIS — E11.9 TYPE 2 DIABETES MELLITUS WITHOUT COMPLICATION, WITHOUT LONG-TERM CURRENT USE OF INSULIN (HCC): ICD-10-CM

## 2020-07-30 LAB
A/G RATIO: 1.8 (ref 1.1–2.2)
ALBUMIN SERPL-MCNC: 4.6 G/DL (ref 3.4–5)
ALP BLD-CCNC: 69 U/L (ref 40–129)
ALT SERPL-CCNC: 21 U/L (ref 10–40)
ANION GAP SERPL CALCULATED.3IONS-SCNC: 13 MMOL/L (ref 3–16)
AST SERPL-CCNC: 18 U/L (ref 15–37)
BASOPHILS ABSOLUTE: 0 K/UL (ref 0–0.2)
BASOPHILS RELATIVE PERCENT: 0.7 %
BILIRUB SERPL-MCNC: 0.5 MG/DL (ref 0–1)
BUN BLDV-MCNC: 13 MG/DL (ref 7–20)
CALCIUM SERPL-MCNC: 9.5 MG/DL (ref 8.3–10.6)
CHLORIDE BLD-SCNC: 103 MMOL/L (ref 99–110)
CHOLESTEROL, TOTAL: 191 MG/DL (ref 0–199)
CO2: 27 MMOL/L (ref 21–32)
CREAT SERPL-MCNC: 1.2 MG/DL (ref 0.9–1.3)
CREATININE URINE: 206.1 MG/DL (ref 39–259)
EOSINOPHILS ABSOLUTE: 0 K/UL (ref 0–0.6)
EOSINOPHILS RELATIVE PERCENT: 0.9 %
GFR AFRICAN AMERICAN: >60
GFR NON-AFRICAN AMERICAN: >60
GLOBULIN: 2.6 G/DL
GLUCOSE BLD-MCNC: 108 MG/DL (ref 70–99)
HCT VFR BLD CALC: 46.5 % (ref 40.5–52.5)
HDLC SERPL-MCNC: 73 MG/DL (ref 40–60)
HEMOGLOBIN: 14.8 G/DL (ref 13.5–17.5)
LDL CHOLESTEROL CALCULATED: 101 MG/DL
LYMPHOCYTES ABSOLUTE: 1.5 K/UL (ref 1–5.1)
LYMPHOCYTES RELATIVE PERCENT: 33.5 %
MCH RBC QN AUTO: 26.1 PG (ref 26–34)
MCHC RBC AUTO-ENTMCNC: 31.9 G/DL (ref 31–36)
MCV RBC AUTO: 81.8 FL (ref 80–100)
MICROALBUMIN UR-MCNC: <1.2 MG/DL
MICROALBUMIN/CREAT UR-RTO: NORMAL MG/G (ref 0–30)
MONOCYTES ABSOLUTE: 0.5 K/UL (ref 0–1.3)
MONOCYTES RELATIVE PERCENT: 11 %
NEUTROPHILS ABSOLUTE: 2.4 K/UL (ref 1.7–7.7)
NEUTROPHILS RELATIVE PERCENT: 53.9 %
PDW BLD-RTO: 15 % (ref 12.4–15.4)
PLATELET # BLD: 218 K/UL (ref 135–450)
PMV BLD AUTO: 7.9 FL (ref 5–10.5)
POTASSIUM SERPL-SCNC: 4.6 MMOL/L (ref 3.5–5.1)
PROSTATE SPECIFIC ANTIGEN: 1.2 NG/ML (ref 0–4)
RBC # BLD: 5.68 M/UL (ref 4.2–5.9)
SODIUM BLD-SCNC: 143 MMOL/L (ref 136–145)
TOTAL PROTEIN: 7.2 G/DL (ref 6.4–8.2)
TRIGL SERPL-MCNC: 87 MG/DL (ref 0–150)
TSH SERPL DL<=0.05 MIU/L-ACNC: 0.33 UIU/ML (ref 0.27–4.2)
VLDLC SERPL CALC-MCNC: 17 MG/DL
WBC # BLD: 4.5 K/UL (ref 4–11)

## 2020-07-30 PROCEDURE — 93000 ELECTROCARDIOGRAM COMPLETE: CPT | Performed by: INTERNAL MEDICINE

## 2020-07-30 PROCEDURE — 99396 PREV VISIT EST AGE 40-64: CPT | Performed by: INTERNAL MEDICINE

## 2020-07-30 RX ORDER — PRENATAL VIT 91/IRON/FOLIC/DHA 28-975-200
COMBINATION PACKAGE (EA) ORAL
Qty: 60 G | Refills: 1 | Status: SHIPPED | OUTPATIENT
Start: 2020-07-30 | End: 2021-08-19

## 2020-07-30 ASSESSMENT — PATIENT HEALTH QUESTIONNAIRE - PHQ9
2. FEELING DOWN, DEPRESSED OR HOPELESS: 0
SUM OF ALL RESPONSES TO PHQ QUESTIONS 1-9: 0
1. LITTLE INTEREST OR PLEASURE IN DOING THINGS: 0
SUM OF ALL RESPONSES TO PHQ9 QUESTIONS 1 & 2: 0
SUM OF ALL RESPONSES TO PHQ QUESTIONS 1-9: 0

## 2020-07-30 NOTE — PATIENT INSTRUCTIONS
diet focusing on Low fat/low cholesterol and low carbohydrate intake, along with  increasing cardiovascular (aerobic) exercise. 6.  Recommend hepatitis B vaccination series. See reading material       Here are a few  Reliable websites with a variety of health and wellness information:   www.mylifecheck. [email or web address] org     www.nutritionsource. org     www. americanheart. org     www. diabetes. org     www.mayoinic     www.[employer] ([facility] site)          Patient Education        hepatitis B adult vaccine  Pronunciation:  HEP a CANDACE tis B a DULT VAX een  Brand:  Engerix-B, Heplisav-B, Recombivax HB Adult, Recombivax HB Dialysis Formulation  What is the most important information I should know about this vaccine? You should not receive hepatitis B vaccine if you are allergic to baker's yeast.  This vaccine will not protect against hepatitis B if you are already infected with the virus, even if you do not yet show symptoms. What is hepatitis B vaccine? Hepatitis is a serious disease caused by a virus. Hepatitis causes inflammation of the liver, vomiting, and jaundice (yellowing of the skin or eyes). Hepatitis can lead to liver cancer, cirrhosis, or death. Hepatitis B is a disease of the liver that is spread through blood or bodily fluids, sexual contact or sharing IV drug needles with an infected person, or during childbirth when the mother is infected. The hepatitis B adult vaccine is used to help prevent this disease in adults. This vaccine works by exposing you to a small amount of the virus, which causes the body to develop immunity to the disease. This vaccine will not treat an active infection that has already developed in the body. Vaccination with hepatitis B adult vaccine is recommended for all adults who are at risk of getting hepatitis B.  Risk factors include: having more than one sex partner; being a homosexual male; having sexual contact with infected people; having chronic liver disease; having diabetes and being under age 61; having HIV or AIDS; using intravenous (IV) drugs; being on dialysis or receiving blood transfusions; working in an institution for developmentally disabled patients; working in healthcare or public safety and being exposed to human blood; being in [employer] or traveling to high-risk areas; and living with a person who has hepatitis B. Like any vaccine, the hepatitis B vaccine may not provide protection from disease in every person. What should I discuss with my healthcare provider before receiving this vaccine? Hepatitis B vaccine will not protect against infection with hepatitis A, C, and E, or other viruses that affect the liver. It may also not protect against hepatitis B if you are already infected with the virus, even if you do not yet show symptoms. You should not receive this vaccine if you have ever had a life-threatening allergic reaction to any vaccine containing hepatitis B, or if you are allergic to baker's yeast.  If you have any of these other conditions, your vaccine may need to be postponed or not given at all:  · multiple sclerosis;  · kidney disease (or if you are on dialysis);  · a bleeding or blood clotting disorder such as hemophilia or easy bruising;  · an allergy to latex rubber; or  · a neurologic disorder or disease affecting the brain (or if this was a reaction to a previous vaccine). You can still receive a vaccine if you have a minor cold. In the case of a more severe illness with a fever or any type of infection, wait until you get better before receiving this vaccine. It is not known whether this vaccine will harm an unborn baby. However, if you are at a high risk for infection with hepatitis B during pregnancy, your doctor should determine whether you need this vaccine. It may not be safe to breast-feed while receiving this medicine. Ask your doctor about any risk. How is this vaccine given?   This vaccine is given as an injection (shot) into a muscle. A healthcare provider will give you this injection. The hepatitis B vaccine is given in a series of shots. The booster shots are sometimes given 1 month and 6 months after the first shot. If you have a high risk of hepatitis B infection, you may be given an additional booster 1 to 2 months after the third shot. Your individual booster schedule may be different from these guidelines. Follow your doctor's instructions or the schedule recommended by the health department of the state you live in. What happens if I miss a dose? Contact your doctor if you will miss a booster dose or if you get behind schedule. The next dose should be given as soon as possible. There is no need to start over. Be sure to receive all recommended doses of this vaccine or you may not be fully protected against disease. What happens if I overdose? An overdose of this vaccine is unlikely to occur. What should I avoid before or after receiving this vaccine? Follow your doctor's instructions about any restrictions on food, beverages, or activity. What are the possible side effects of this vaccine? Get emergency medical help if you have signs of an allergic reaction (hives, difficult breathing, swelling in your face or throat) or a severe skin reaction (fever, sore throat, burning eyes, skin pain, red or purple skin rash with blistering and peeling). You should not receive a booster vaccine if you had a life-threatening allergic reaction after the first shot. Keep track of any and all side effects you have after receiving this vaccine. When you receive a booster dose, you will need to tell the doctor if the previous shot caused any side effects. Becoming infected with hepatitis B is much more dangerous to your health than receiving this vaccine. However, like any medicine, this vaccine can cause side effects but the risk of serious side effects is extremely low.   Call your doctor at once if you have:  · high fever, sore throat, and headache with a severe blistering, peeling, and red skin rash;  · changes in behavior;  · bruising or bleeding; or  · fever, swollen glands, itching, joint pain, or not feeling well. Common side effects include:  · redness, pain, swelling, or a lump where the shot was given;  · diarrhea, loss of appetite;  · headache, dizziness, weakness;  · low fever;  · feeling tired or irritable; or  · runny nose. This is not a complete list of side effects and others may occur. Call your doctor for medical advice about side effects. You may report vaccine side effects to the [address] [place] [address] and Human Services at [phone]. What other drugs will affect hepatitis B vaccine? Other drugs may affect this vaccine, including prescription and over-the-counter medicines, vitamins, and herbal products. Tell your doctor about all your current medicines and any medicine you start or stop using. Where can I get more information? Your doctor or pharmacist can provide more information about this vaccine. Additional information is available from your local health department or the Centers for Disease Control and Prevention. Remember, keep this and all other medicines out of the reach of children, never share your medicines with others, and use this medication only for the indication prescribed. Every effort has been made to ensure that the information provided by [address] is accurate, up-to-date, and complete, but no guarantee is made to that effect. Drug information contained herein may be time sensitive. [facility] information has been compiled for use by healthcare practitioners and consumers in the [place] and therefore [facility] does not warrant that uses outside of the [place] are appropriate, unless specifically indicated otherwise. [facility] drug information does not endorse drugs, diagnose patients or recommend therapy.  [facility] drug information is an informational to liver damage (cirrhosis), liver cancer, and death. Chronically-infected people can spread hepatitis B virus to others, even if they do not feel or look sick themselves. Hepatitis B is spread when blood, semen, or other body fluid infected with the hepatitis B virus enters the body of a person who is not infected. People can become infected through:  · Birth (if a mother has hepatitis B, her baby can become infected)  · Sharing items such as razors or toothbrushes with an infected person  · Contact with the blood or open sores of an infected person  · Sex with an infected partner  · Sharing needles, syringes, or other drug-injection equipment  · Exposure to blood from needlesticks or other sharp instruments  Most people who are vaccinated with hepatitis B vaccine are immune for life. Hepatitis B vaccine  Hepatitis B vaccine is usually given as 2, 3, or 4 shots. Infants should get their first dose of hepatitis B vaccine at birth and will usually complete the series at 10months of age (sometimes it will take longer than 6 months to complete the series). Children and adolescents younger than 23years of age who have not yet gotten the vaccine should also be vaccinated.   Hepatitis B vaccine is also recommended for certain unvaccinated adults:  · People whose sex partners have hepatitis B  · Sexually active persons who are not in a long-term monogamous relationship  · Persons seeking evaluation or treatment for a sexually transmitted disease  · Men who have sexual contact with other men  · People who share needles, syringes, or other drug-injection equipment  · People who have household contact with someone infected with the hepatitis B virus  · Health care and public safety workers at risk for exposure to blood or body fluids  · Residents and staff of facilities for developmentally disabled persons  · Persons in correctional facilities  · Victims of sexual assault or abuse  · Travelers to regions with increased rates of hepatitis B  · People with chronic liver disease, kidney disease, HIV infection, infection with hepatitis C, or diabetes  · Anyone who wants to be protected from hepatitis B  Hepatitis B vaccine may be given at the same time as other vaccines. Talk with your health care provider  Tell your vaccine provider if the person getting the vaccine:  · Has had an allergic reaction after a previous dose of hepatitis B vaccine, or has any severe, life-threatening allergies. In some cases, your health care provider may decide to postpone hepatitis B vaccination to a future visit. People with minor illnesses, such as a cold, may be vaccinated. People who are moderately or severely ill should usually wait until they recover before getting hepatitis B vaccine. Your health care provider can give you more information. Risks of a vaccine reaction  · Soreness where the shot is given or fever can happen after hepatitis B vaccine. People sometimes faint after medical procedures, including vaccination. Tell your provider if you feel dizzy or have vision changes or ringing in the ears. As with any medicine, there is a very remote chance of a vaccine causing a severe allergic reaction, other serious injury, or death. What if there is a serious problem? An allergic reaction could occur after the vaccinated person leaves the clinic. If you see signs of a severe allergic reaction (hives, swelling of the face and throat, difficulty breathing, a fast heartbeat, dizziness, or weakness), call 9-1-1 and get the person to the nearest hospital.  For other signs that concern you, call your health care provider. Adverse reactions should be reported to the Vaccine Adverse Event Reporting System (VAERS). Your health care provider will usually file this report, or you can do it yourself. Visit the VAERS website at www.vaers. hhs.gov or call 8-544.717.3650.  VAERS is only for reporting reactions, and VAERS staff do not give medical advice. The National Vaccine Injury Compensation Program  The National Vaccine Injury Compensation Program (VICP) is a federal program that was created to compensate people who may have been injured by certain vaccines. Visit the VICP website at www.hrsa.gov/vaccinecompensation or call 1-961.787.4462 to learn about the program and about filing a claim. There is a time limit to file a claim for compensation. How can I learn more? · Ask your healthcare provider. · Call your local or state health department. · Contact the Centers for Disease Control and Prevention (CDC):  ? Call 3-588.257.5762 (8-189-VQV-INFO) or  ? Visit CDC's website at www.cdc.gov/vaccines. Vaccine Information Statement (Interim)  Hepatitis B Vaccine  8/15/2019  42 U. S.C. § 300aa-26  [place]. S. Department of Health and Human Services  Centers for Disease Control and Prevention  Many Vaccine Information Statements are available in Serbian and other languages. See www.immunize.org/vis. Hojas de información sobre vacunas están disponibles en español y en otros idiomas. Visite www.immunize.org/vis. Care instructions adapted under license by [facility] ([facility]). If you have questions about a medical condition or this instruction, always ask your healthcare professional. [place] [address] any warranty or liability for your use of this information. Patient Education        Learning About Diabetes Food Guidelines  Your Care Instructions     Meal planning is important to manage diabetes. It helps keep your blood sugar at a target level (which you set with your doctor). You don't have to eat special foods. You can eat what your family eats, including sweets once in a while. But you do have to pay attention to how often you eat and how much you eat of certain foods. You may want to work with a dietitian or a certified diabetes educator (CDE) to help you plan meals and snacks.  A dietitian or CDE can also help you lose weight if that is one of your goals. What should you know about eating carbs? Managing the amount of carbohydrate (carbs) you eat is an important part of healthy meals when you have diabetes. Carbohydrate is found in many foods. · Learn which foods have carbs. And learn the amounts of carbs in different foods. ? Bread, cereal, pasta, and rice have about 15 grams of carbs in a serving. A serving is 1 slice of bread (1 ounce), ½ cup of cooked cereal, or 1/3 cup of cooked pasta or rice. ? Fruits have 15 grams of carbs in a serving. A serving is 1 small fresh fruit, such as an apple or orange; ½ of a banana; ½ cup of cooked or canned fruit; ½ cup of fruit juice; 1 cup of melon or raspberries; or 2 tablespoons of dried fruit. ? Milk and no-sugar-added yogurt have 15 grams of carbs in a serving. A serving is 1 cup of milk or 2/3 cup of no-sugar-added yogurt. ? Starchy vegetables have 15 grams of carbs in a serving. A serving is ½ cup of mashed potatoes or sweet potato; 1 cup winter squash; ½ of a small baked potato; ½ cup of cooked beans; or ½ cup cooked corn or green peas. · Learn how much carbs to eat each day and at each meal. A dietitian or CDE can teach you how to keep track of the amount of carbs you eat. This is called carbohydrate counting. · If you are not sure how to count carbohydrate grams, use the Plate Method to plan meals. It is a good, quick way to make sure that you have a balanced meal. It also helps you spread carbs throughout the day. ? Divide your plate by types of foods. Put non-starchy vegetables on half the plate, meat or other protein food on one-quarter of the plate, and a grain or starchy vegetable in the final quarter of the plate.  To this you can add a small piece of fruit and 1 cup of milk or yogurt, depending on how many carbs you are supposed to eat at a meal.  · Try to eat about the same amount of carbs at each meal. Do not \"save up\" your daily allowance of carbs to eat at one meal.  · Proteins have very little or no carbs per serving. Examples of proteins are beef, chicken, turkey, fish, eggs, tofu, cheese, cottage cheese, and peanut butter. A serving size of meat is 3 ounces, which is about the size of a deck of cards. Examples of meat substitute serving sizes (equal to 1 ounce of meat) are 1/4 cup of cottage cheese, 1 egg, 1 tablespoon of peanut butter, and ½ cup of tofu. How can you eat out and still eat healthy? · Learn to estimate the serving sizes of foods that have carbohydrate. If you measure food at home, it will be easier to estimate the amount in a serving of restaurant food. · If the meal you order has too much carbohydrate (such as potatoes, corn, or baked beans), ask to have a low-carbohydrate food instead. Ask for a salad or green vegetables. · If you use insulin, check your blood sugar before and after eating out to help you plan how much to eat in the future. · If you eat more carbohydrate at a meal than you had planned, take a walk or do other exercise. This will help lower your blood sugar. What else should you know? · Limit saturated fat, such as the fat from meat and dairy products. This is a healthy choice because people who have diabetes are at higher risk of heart disease. So choose lean cuts of meat and nonfat or low-fat dairy products. Use olive or canola oil instead of butter or shortening when cooking. · Don't skip meals. Your blood sugar may drop too low if you skip meals and take insulin or certain medicines for diabetes. · Check with your doctor before you drink alcohol. Alcohol can cause your blood sugar to drop too low. Alcohol can also cause a bad reaction if you take certain diabetes medicines. Follow-up care is a key part of your treatment and safety. Be sure to make and go to all appointments, and call your doctor if you are having problems. It's also a good idea to know your test results and keep a list of the medicines you take. Where can you learn more?   Go to plate format, you put non-starchy vegetables on half your plate. Add meat or meat substitutes on one-quarter of the plate. Put a grain or starchy vegetable (such as brown rice or a potato) on the final quarter of the plate. You can add a small piece of fruit and some low-fat or fat-free milk or yogurt, depending on your carbohydrate goal for each meal.  Here are some tips for using the plate format:  · Make sure that you are not using an oversized plate. A 9-inch plate is best. Many restaurants use larger plates. · Get used to using the plate format at home. Then you can use it when you eat out. · Write down your questions about using the plate format. Talk to your doctor, a dietitian, or a diabetes educator about your concerns. Carbohydrate counting  With carbohydrate counting, you plan meals based on the amount of carbohydrate in each food. Carbohydrate raises blood sugar higher and more quickly than any other nutrient. It is found in desserts, breads and cereals, and fruit. It's also found in starchy vegetables such as potatoes and corn, grains such as rice and pasta, and milk and yogurt. Spreading carbohydrate throughout the day helps keep your blood sugar levels within your target range. Your daily amount depends on several things, including your weight, how active you are, which diabetes medicines you take, and what your goals are for your blood sugar levels. A registered dietitian or diabetes educator can help you plan how much carbohydrate to include in each meal and snack. A guideline for your daily amount of carbohydrate is:  · 45 to 60 grams at each meal. That's about the same as 3 to 4 carbohydrate servings. · 15 to 20 grams at each snack. That's about the same as 1 carbohydrate serving. The Nutrition Facts label on packaged foods tells you how much carbohydrate is in a serving of the food. First, look at the serving size on the food label. Is that the amount you eat in a serving?  All of the need.  · Take the list to the store to do your weekly shopping. Follow-up care is a key part of your treatment and safety. Be sure to make and go to all appointments, and call your doctor if you are having problems. It's also a good idea to know your test results and keep a list of the medicines you take. Where can you learn more? Go to [email or web address] and sign in to your [employer] account. Enter G240 in the ipnexus box to learn more about \"Learning About Meal Planning for Diabetes. \"     If you do not have an account, please click on the \"Sign Up Now\" link. Current as of: December 20, 2019               Content Version: 12.5  © 1920-4561 Healthwise[employer] Incorporated. Care instructions adapted under license by [facility] ([facility]). If you have questions about a medical condition or this instruction, always ask your healthcare professional. [place] [address] any warranty or liability for your use of this information. Patient Education        Prostate Cancer Screening: Care Instructions  Your Care Instructions     Prostate cancer is the abnormal growth of cells in the prostate gland. This is an organ found just below a man's bladder. Screening can help find prostate cancer early. When it is found and treated early, the cancer may be cured. But it's not always treated. That's because the treatments can cause serious side effects. For most men, prostate cancer won't shorten their lives, especially if they are older and the cancer is growing slowly. Prostate cancer is the second most common type of cancer in men. Most cases occur in men older than 72. The disease runs in families. And it's more common in -American men. Follow-up care is a key part of your treatment and safety. Be sure to make and go to all appointments, and call your doctor if you are having problems.  It's also a good idea to know your test results and keep a list of the medicines you take.  What is the screening test for prostate cancer? The main screening test for prostate cancer is the prostate-specific antigen (PSA) test. This is a blood test that measures how much PSA is in your blood. A high level may mean that you have an enlarged prostate, an infection, or cancer. Along with the PSA test, you may have a digital (finger) rectal exam. This exam checks for anything abnormal in your prostate. To do the exam, the doctor puts a lubricated, gloved finger into your rectum. If these tests suggest cancer, you may need a prostate biopsy. How is prostate cancer diagnosed? In a biopsy, the doctor takes small tissue samples from your prostate gland. Another doctor then looks at the tissue under a microscope to see if there are cancer cells, signs of infection, or other problems. The results help diagnose prostate cancer. What are the pros and cons of screening? Neither a PSA test nor a digital rectal exam can tell you for sure that you do or do not have cancer. But they can help you decide if you need more tests, such as a prostate biopsy. Screening tests may be useful because most men with prostate cancer don't have symptoms. It can be hard to know if you have cancer until it is more advanced. And then it's harder to treat. But having a PSA test can also cause harm. The test may show high levels of PSA that aren't caused by cancer. So you could have a prostate biopsy you didn't need. Or the PSA test might be normal when there is cancer, so a cancer might not be found early. The test can also find cancers that would never have caused a problem during your lifetime. So you might have treatment that was not needed. Prostate cancer usually develops late in life and grows slowly. For many men, it does not shorten their lives. Some experts advise screening only for men who are at high risk. Talk with your doctor to see if screening is right for you. Where can you learn more?   Go to [email or web address] and sign in to your [employer] account. Enter R550 in the [facility] box to learn more about \"Prostate Cancer Screening: Care Instructions. \"     If you do not have an account, please click on the \"Sign Up Now\" link. Current as of: August 22, 2019               Content Version: 12.5  © 3665-9718 [employer]. Care instructions adapted under license by [facility] ([facility]). If you have questions about a medical condition or this instruction, always ask your healthcare professional. [address] any warranty or liability for your use of this information. Patient Education        Athlete's Foot: Care Instructions  Your Care Instructions     Athlete's foot is an itchy rash on the foot caused by an infection with a fungus. You can get it by going barefoot in wet public areas, such as swimming pools or locker rooms. Many times there is no clear reason why you get athlete's foot. You can easily treat athlete's foot by putting medicine on your feet for 1 to 6 weeks. In some cases, a doctor may prescribe pills to kill the fungus. Follow-up care is a key part of your treatment and safety. Be sure to make and go to all appointments, and call your doctor if you are having problems. It's also a good idea to know your test results and keep a list of the medicines you take. How can you care for yourself at home? · Your doctor may suggest an over-the counter lotion or spray or may prescribe a medicine. Take your medicines exactly as prescribed. Call your doctor if you think you are having a problem with your medicine. · Keep your feet clean and dry. · When you get dressed, put your socks on before your underwear. This can prevent the fungus from spreading from your feet to your groin. To prevent athlete's foot  · Wear flip-flops or other shower sandals in public locker rooms and showers and by the pool.   · Dry between your toes after swimming or bathing. · Wear leather shoes or sandals, which let air get to your feet. · Change your socks as needed so your feet stay as dry as possible. · Use antifungal powder on your feet. When should you call for help? Watch closely for changes in your health, and be sure to contact your doctor if:  · You do not get better as expected. Where can you learn more? Go to https://Ummitechpepiceweb.[employer]. org and sign in to your FishBrain account. Enter M498 in the Perpetuelle.com box to learn more about \"Athlete's Foot: Care Instructions. \"     If you do not have an account, please click on the \"Sign Up Now\" link. Current as of: October 31, 2019               Content Version: 12.5  © 9507-8932 Healthwise, Incorporated. Care instructions adapted under license by [facility] ([facility]). If you have questions about a medical condition or this instruction, always ask your healthcare professional. [place] [address] any warranty or liability for your use of this information. Patient Education        Well Visit, Men 48 to 72: Care Instructions  Your Care Instructions     Physical exams can help you stay healthy. Your doctor has checked your overall health and may have suggested ways to take good care of yourself. He or she also may have recommended tests. At home, you can help prevent illness with healthy eating, regular exercise, and other steps. Follow-up care is a key part of your treatment and safety. Be sure to make and go to all appointments, and call your doctor if you are having problems. It's also a good idea to know your test results and keep a list of the medicines you take. How can you care for yourself at home? · Reach and stay at a healthy weight. This will lower your risk for many problems, such as obesity, diabetes, heart disease, and high blood pressure. · Get at least 30 minutes of exercise on most days of the week. Walking is a good choice.  You also may want to do other activities, such as running, swimming, cycling, or playing tennis or team sports. · Do not smoke. Smoking can make health problems worse. If you need help quitting, talk to your doctor about stop-smoking programs and medicines. These can increase your chances of quitting for good. · Protect your skin from too much sun. When you're outdoors from 10 a.m. to 4 p.m., stay in the shade or cover up with clothing and a hat with a wide brim. Wear sunglasses that block UV rays. Even when it's cloudy, put broad-spectrum sunscreen (SPF 30 or higher) on any exposed skin. · See a dentist one or two times a year for checkups and to have your teeth cleaned. · Wear a seat belt in the car. Follow your doctor's advice about when to have certain tests. These tests can spot problems early. · Cholesterol. Your doctor will tell you how often to have this done based on your overall health and other things that can increase your risk for heart attack and stroke. · Blood pressure. Have your blood pressure checked during a routine doctor visit. Your doctor will tell you how often to check your blood pressure based on your age, your blood pressure results, and other factors. · Prostate exam. Talk to your doctor about whether you should have a blood test (called a PSA test) for prostate cancer. Experts recommend that you discuss the benefits and risks of the test with your doctor before you decide whether to have this test.  · Diabetes. Ask your doctor whether you should have tests for diabetes. · Vision. Some experts recommend that you have yearly exams for glaucoma and other age-related eye problems starting at age 48. · Hearing. Tell your doctor if you notice any change in your hearing. You can have tests to find out how well you hear. · Colorectal cancer. Your risk for colorectal cancer gets higher as you get older. Some experts say that adults should start regular screening at age 48 and stop at age 76.  Others say to start before age 48 or continue after age 76. Talk with your doctor about your risk and when to start and stop screening. · Heart attack and stroke risk. At least every 4 to 6 years, you should have your risk for heart attack and stroke assessed. Your doctor uses factors such as your age, blood pressure, cholesterol, and whether you smoke or have diabetes to show what your risk for a heart attack or stroke is over the next 10 years. · Abdominal aortic aneurysm. Ask your doctor whether you should have a test to check for an aneurysm. You may need a test if you ever smoked or if your parent, brother, sister, or child has had an aneurysm. When should you call for help? Watch closely for changes in your health, and be sure to contact your doctor if you have any problems or symptoms that concern you. Where can you learn more? Go to https://chlesaeb.[employer]. org and sign in to your MobFox account. Enter Q858 in the [employer] box to learn more about \"Well Visit, Men 48 to 72: Care Instructions. \"     If you do not have an account, please click on the \"Sign Up Now\" link. Current as of: August 22, 2019               Content Version: 12.5  © 9456-0657 iLike. Care instructions adapted under license by [facility] ([facility]). If you have questions about a medical condition or this instruction, always ask your healthcare professional. [place] [address] any warranty or liability for your use of this information. Patient Education        Diabetes Foot Health: Care Instructions  Your Care Instructions     When you have diabetes, your feet need extra care and attention. Diabetes can damage the nerve endings and blood vessels in your feet, making you less likely to notice when your feet are injured. Diabetes also limits your body's ability to fight infection and get blood to areas that need it.  If you get a minor foot injury, it could become an ulcer or a serious a foot sore. · You have new numbness or tingling in your feet that does not go away after you move your feet or change positions. · You have unexplained or unusual swelling of the foot or ankle. Watch closely for changes in your health, and be sure to contact your doctor if:  · You cannot do proper foot care. Where can you learn more? Go to https://One2startpepiceweb.K[employer]ik. org and sign in to your Te[employer] People account. Enter A739 in the Talkspace box to learn more about \"Diabetes Foot Health: Care Instructions. \"     If you do not have an account, please click on the \"Sign Up Now\" link. Current as of: December 20, 2019               Content Version: 12.5  © 1739-5823 Healthwise, Incorporated. Care instructions adapted under license by [facility] ([facility]). If you have questions about a medical condition or this instruction, always ask your healthcare professional. [address] any warranty or liability for your use of this information.

## 2020-07-30 NOTE — PROGRESS NOTES
[facility]) Physicians  Internal Medicine  Patient Encounter  Srinivasa Retana D.O., [facility] Preventative Physical    Chief Complaint   Patient presents with    Annual Exam       HPI-- 53 y.o. male presents today for a complete annual physical.     Had a COVID-19 test 1 week ago. Awaiting results. 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 Medical/Surgical Histories     Past Medical History:   Diagnosis Date    HNP (herniated nucleus pulposus), cervical 2009    Dr. Julio César Johnston Impaired fasting glucose     Psoriasis     Dr. Juan Manuel Moreno    Type 2 diabetes mellitus without complication, without long-term current use of insulin ([address] 5/10/2018          No past surgical history on file. Medications/Allergies     Medication Sig   aspirin EC 81 MG EC tablet Take 1 tablet by mouth daily. No Known Allergies      Substance Use History     Social History     Tobacco Use    Smoking status: Never Smoker    Smokeless tobacco: Never Used   Substance Use Topics    Alcohol use: No    Drug use: No      [employer]-- Accounting    Family History     Family History   Problem Relation Age of Onset    High Blood Pressure Mother     Diabetes Mother     Diabetes Father     Heart Disease Father 77        CAD, CABG    High Blood Pressure Father     Kidney Disease Brother         stone    High Blood Pressure Brother     Obesity Brother     Heart Disease Brother         CHF    Heart Disease Brother 47        CAD, PTCA with stent, CHF    Diabetes Brother     Hypertension Brother               REVIEW OF SYSTEMS:    CONSTITUTIONAL:  Neg  fatigue, fever, chills or night sweats, anorexia, Sleep difficulties. His weight had increased to 317# during sheltering in place, but decreased with lifestyle changes. EYES: Neg  Blurry vision, loss of vision, double vision, tearing, itching, eye pain. + Glasses. EARS:  Neg Hearing loss, tinnitus, vertigo, discharge or otalgia.   NOSE:  Neg  itching, epistaxis, exam  12/08/2019    Shingles Vaccine (3 of 3) 08/29/2020 (Originally 4/24/2019)    Diabetic foot exam  08/29/2020    A1C test (Diabetic or Prediabetic)  08/29/2020    Diabetic microalbuminuria test  08/29/2020    Lipid screen  08/29/2020    Flu vaccine (1) 09/01/2020    DTaP/Tdap/Td vaccine (2 - Td) 01/29/2023    Colon cancer screen colonoscopy  04/15/2024    Pneumococcal 0-64 years Vaccine  Completed    Hepatitis C screen  Completed    HIV screen  Completed    Hepatitis A vaccine  Aged Out    Hib vaccine  Aged Out    Meningococcal (ACWY) vaccine  Aged Out      Self-testicular exams: Yes  Sexual activity: single partner, wife. Last eye exam: 12/2018, glasses. Exercise: Exercising 4-5 days per week. He does cardio 30-45 minutes, Light weight lifting. Seatbelt use: Yes  Sunscreen Use: Yes  Dentist:  Every 6 months, UTD    Physical Exam    /87   Pulse 86   Temp 97.5 °F (36.4 °C)   Resp 12   Ht 6' (1.829 m)   Wt (!) 306 lb (138.8 kg)   BMI 41.50 kg/m²     Wt Readings from Last 3 Encounters:   07/30/20 (!) 306 lb (138.8 kg)   08/29/19 (!) 309 lb (140.2 kg)   02/15/19 (!) 311 lb (141.1 kg)       BP Readings from Last 3 Encounters:   07/30/20 127/87   08/29/19 138/84   02/15/19 115/70         GEN:  53 y.o. male who is in NAD, A&O. He appears stated age and well nourished. He is cooperative and pleasant. Morbidly obese  HEAD:  NC/AT, no lesions. EYES:  JERMAINE, EOMI, No scleral icterus or conjunctival injection or discharge. Visual fields in tact to confrontation. EARS:  EAC's clear, TM's normal.  NOSE:  Nasal cavity is clear. No mucosal congestion or discharge. Sinuses are nontender. MOUTH & THROAT:  Oral cavity is clear without mucosal lesions. Tongue is midline. Dentition is in good repair. No pharyngeal erythema or exudate. NECK:  Supple. Full ROM. Trachea is midline. No increased JVD. No thyromegaly or nodules.   No masses  LYMPH: No C/SC/A/F nodes  CARDIAC: Heart rhythm is regular. No murmurs. No ectopy. Rate is normal.  VASC:  Pedal pulses 2/4. Carotid upstrokes 2+. No bruits noted. PULM:  Lungs are CTA. Symmetric breath sounds noted. AP Diameter NL. No wheezing or forced expiratory wheezing. GI:  Abdomen is soft and nontender. No distension. No organomegaly. No masses. No pulsatile masses. :   External genitalia NL. No testicular or scrotal masses. Penile shaft is NL. No inguinal hernias  BREAST:  No masses. EXT:  No Cyanosis or clubbing. No edema. SKIN: Warm and dry, normal turgor, + Psoriatic plaques BL knees, elbows. Moist, macerated skin between toes 1 and 2 and 4 and 5 on the right and similar findings between toes 4 and 5 on the left. Dry cracking skin on the plantar surface of his feet. NEURO:  Cranial nerves 2-12 are NL. Speech fluent and coherent. Strength is 5/5 in all muscle groups. No sensory deficits. No focal or lateralizing deficits. Reflexes 2/4 and symmetric. Gait is normal.  Monofilament vibratory sensation intact in both feet  MS:  No C/T/L paraspinal tenderness. No scoliosis. No joint effusions. Full joint ROM. PSYCH:  Mood and affect NL. Judgement and insight NL.          ASSESSMENT/PLAN:    1. Annual physical exam  All care gaps identified and addressed  Continue aggressive lifestyle modification  Update diabetic retinal eye exam  - EKG 12 Lead  - CBC Auto Differential; Future  - Comprehensive Metabolic Panel; Future  - Lipid Panel; Future  - Hemoglobin A1C; Future  - TSH without Reflex; Future  - Psa screening; Future    2. Type 2 diabetes mellitus without complication, without long-term current use of insulin ([address]  Update diabetic retinal eye exam  - Microalbumin / Creatinine Urine Ratio; Future  - Comprehensive Metabolic Panel; Future  - Lipid Panel; Future  - Hemoglobin A1C; Future    3. Screening for prostate cancer    - Psa screening; Future    4.  Hyperlipidemia, unspecified hyperlipidemia type    - Comprehensive Metabolic Panel; Future  - Lipid Panel; Future    5. Tinea pedis of both feet    - terbinafine (ATHLETES FOOT) 1 % cream; Apply topically 2 times daily for 2-4 weeks. Dispense: 60 g; Refill: 1           Preventive plan of care for Marci Costa        7/30/2020           Preventive Measures Status       Recommendations for screening   Prostate Cancer Screen  Lab Results   Component Value Date    PSA 0.85 08/29/2019     Test recommended and ordered    Colon Cancer Screen  Last colonoscopy: 4/15/14  Repeat every 10 years-- Due 2024   Diabetes Screen  Glucose (mg/dL)   Date Value   08/29/2019 116 (H)    Test recommended and ordered   Cholesterol Screen  Lab Results   Component Value Date    CHOL 198 08/29/2019    TRIG 92 08/29/2019    HDL 64 (H) 08/29/2019    LDLCALC 116 (H) 08/29/2019    Test recommended and ordered   HIV screening recommended for those ages 12-76 NO MATTER THE RISK FOR HIV--  3/14/2017  No need to repeat at this time    Hepatitis C screening recommended for those born between [facility]-- 3/14/2017  No need to repeat at this time    Aspirin for Cardiovascular Prevention   Yes Continue Baby Aspirin Daily     Recommended Immunizations    Immunization History   Administered Date(s) Administered    Influenza Virus Vaccine 02/09/2016, 12/13/2017    Influenza, Quadv, IM, PF (6 mo and older Fluzone, Flulaval, Fluarix, and 3 yrs and older Afluria) 10/15/2018    Pneumococcal Polysaccharide ([id number]) 05/10/2018    Tdap (Boostrix, Adacel) 01/29/2013    Zoster Live (Zostavax) 12/19/2018    Zoster Recombinant (Shingrix) 02/27/2019    Influenza vaccine: recommended every fall--obtain as early as possible    Pneumonia vaccine: due at age 72     Shingles vaccine: Shingrix is completed. Need dates of injections.     Tetanus vaccine: tetanus and diptheria vaccine (Td/Tdap) recommended every 10 years- Td due 2023              1. Use sunscreen daily to help reduce the risk of

## 2020-07-31 LAB
ESTIMATED AVERAGE GLUCOSE: 148.5 MG/DL
HBA1C MFR BLD: 6.8 %

## 2021-08-19 ENCOUNTER — OFFICE VISIT (OUTPATIENT)
Dept: INTERNAL MEDICINE CLINIC | Age: 56
End: 2021-08-19
Payer: COMMERCIAL

## 2021-08-19 VITALS
DIASTOLIC BLOOD PRESSURE: 78 MMHG | OXYGEN SATURATION: 98 % | RESPIRATION RATE: 16 BRPM | BODY MASS INDEX: 41.31 KG/M2 | HEART RATE: 100 BPM | WEIGHT: 305 LBS | SYSTOLIC BLOOD PRESSURE: 130 MMHG | HEIGHT: 72 IN

## 2021-08-19 DIAGNOSIS — E11.9 TYPE 2 DIABETES MELLITUS WITHOUT COMPLICATION, WITHOUT LONG-TERM CURRENT USE OF INSULIN (HCC): ICD-10-CM

## 2021-08-19 DIAGNOSIS — Z23 NEED FOR HEPATITIS B VACCINATION: ICD-10-CM

## 2021-08-19 DIAGNOSIS — E55.9 VITAMIN D DEFICIENCY: ICD-10-CM

## 2021-08-19 DIAGNOSIS — Z00.00 ANNUAL PHYSICAL EXAM: Primary | ICD-10-CM

## 2021-08-19 DIAGNOSIS — R05.9 COUGH: ICD-10-CM

## 2021-08-19 DIAGNOSIS — E78.5 HYPERLIPIDEMIA, UNSPECIFIED HYPERLIPIDEMIA TYPE: ICD-10-CM

## 2021-08-19 DIAGNOSIS — Z12.5 SCREENING FOR PROSTATE CANCER: ICD-10-CM

## 2021-08-19 DIAGNOSIS — B35.3 TINEA PEDIS OF BOTH FEET: ICD-10-CM

## 2021-08-19 LAB
CREATININE URINE: 167.1 MG/DL (ref 39–259)
MICROALBUMIN UR-MCNC: <1.2 MG/DL
MICROALBUMIN/CREAT UR-RTO: NORMAL MG/G (ref 0–30)

## 2021-08-19 PROCEDURE — 90746 HEPB VACCINE 3 DOSE ADULT IM: CPT | Performed by: INTERNAL MEDICINE

## 2021-08-19 PROCEDURE — 90471 IMMUNIZATION ADMIN: CPT | Performed by: INTERNAL MEDICINE

## 2021-08-19 PROCEDURE — 99396 PREV VISIT EST AGE 40-64: CPT | Performed by: INTERNAL MEDICINE

## 2021-08-19 RX ORDER — BENZONATATE 200 MG/1
200 CAPSULE ORAL 3 TIMES DAILY PRN
Qty: 30 CAPSULE | Refills: 0 | Status: SHIPPED | OUTPATIENT
Start: 2021-08-19 | End: 2022-02-17 | Stop reason: ALTCHOICE

## 2021-08-19 SDOH — ECONOMIC STABILITY: TRANSPORTATION INSECURITY
IN THE PAST 12 MONTHS, HAS LACK OF TRANSPORTATION KEPT YOU FROM MEETINGS, WORK, OR FROM GETTING THINGS NEEDED FOR DAILY LIVING?: NO

## 2021-08-19 SDOH — ECONOMIC STABILITY: INCOME INSECURITY: IN THE LAST 12 MONTHS, WAS THERE A TIME WHEN YOU WERE NOT ABLE TO PAY THE MORTGAGE OR RENT ON TIME?: NO

## 2021-08-19 SDOH — HEALTH STABILITY: PHYSICAL HEALTH: ON AVERAGE, HOW MANY DAYS PER WEEK DO YOU ENGAGE IN MODERATE TO STRENUOUS EXERCISE (LIKE A BRISK WALK)?: 4 DAYS

## 2021-08-19 SDOH — HEALTH STABILITY: PHYSICAL HEALTH: ON AVERAGE, HOW MANY MINUTES DO YOU ENGAGE IN EXERCISE AT THIS LEVEL?: 60 MIN

## 2021-08-19 SDOH — ECONOMIC STABILITY: FOOD INSECURITY: WITHIN THE PAST 12 MONTHS, THE FOOD YOU BOUGHT JUST DIDN'T LAST AND YOU DIDN'T HAVE MONEY TO GET MORE.: NEVER TRUE

## 2021-08-19 SDOH — ECONOMIC STABILITY: TRANSPORTATION INSECURITY
IN THE PAST 12 MONTHS, HAS THE LACK OF TRANSPORTATION KEPT YOU FROM MEDICAL APPOINTMENTS OR FROM GETTING MEDICATIONS?: NO

## 2021-08-19 SDOH — ECONOMIC STABILITY: FOOD INSECURITY: WITHIN THE PAST 12 MONTHS, YOU WORRIED THAT YOUR FOOD WOULD RUN OUT BEFORE YOU GOT MONEY TO BUY MORE.: NEVER TRUE

## 2021-08-19 SDOH — ECONOMIC STABILITY: HOUSING INSECURITY
IN THE LAST 12 MONTHS, WAS THERE A TIME WHEN YOU DID NOT HAVE A STEADY PLACE TO SLEEP OR SLEPT IN A SHELTER (INCLUDING NOW)?: NO

## 2021-08-19 ASSESSMENT — PATIENT HEALTH QUESTIONNAIRE - PHQ9
SUM OF ALL RESPONSES TO PHQ QUESTIONS 1-9: 0
2. FEELING DOWN, DEPRESSED OR HOPELESS: 0
SUM OF ALL RESPONSES TO PHQ9 QUESTIONS 1 & 2: 0
1. LITTLE INTEREST OR PLEASURE IN DOING THINGS: 0

## 2021-08-19 ASSESSMENT — SOCIAL DETERMINANTS OF HEALTH (SDOH)
DO YOU BELONG TO ANY CLUBS OR ORGANIZATIONS SUCH AS CHURCH GROUPS UNIONS, FRATERNAL OR ATHLETIC GROUPS, OR SCHOOL GROUPS?: NO
WITHIN THE LAST YEAR, HAVE TO BEEN RAPED OR FORCED TO HAVE ANY KIND OF SEXUAL ACTIVITY BY YOUR PARTNER OR EX-PARTNER?: NO
HOW OFTEN DO YOU GET TOGETHER WITH FRIENDS OR RELATIVES?: THREE TIMES A WEEK
WITHIN THE LAST YEAR, HAVE YOU BEEN AFRAID OF YOUR PARTNER OR EX-PARTNER?: NO
HOW OFTEN DO YOU ATTENT MEETINGS OF THE CLUB OR ORGANIZATION YOU BELONG TO?: NEVER
WITHIN THE LAST YEAR, HAVE YOU BEEN HUMILIATED OR EMOTIONALLY ABUSED IN OTHER WAYS BY YOUR PARTNER OR EX-PARTNER?: NO
HOW OFTEN DO YOU ATTEND CHURCH OR RELIGIOUS SERVICES?: NEVER
HOW HARD IS IT FOR YOU TO PAY FOR THE VERY BASICS LIKE FOOD, HOUSING, MEDICAL CARE, AND HEATING?: NOT HARD AT ALL
IN A TYPICAL WEEK, HOW MANY TIMES DO YOU TALK ON THE PHONE WITH FAMILY, FRIENDS, OR NEIGHBORS?: THREE TIMES A WEEK
WITHIN THE LAST YEAR, HAVE YOU BEEN KICKED, HIT, SLAPPED, OR OTHERWISE PHYSICALLY HURT BY YOUR PARTNER OR EX-PARTNER?: NO

## 2021-08-19 ASSESSMENT — LIFESTYLE VARIABLES: HOW OFTEN DO YOU HAVE A DRINK CONTAINING ALCOHOL: NEVER

## 2021-08-19 NOTE — PATIENT INSTRUCTIONS
Preventive plan of care for Pretty Benitez        8/19/2021           Preventive Measures Status       Recommendations for screening   Prostate Cancer Screen  Lab Results   Component Value Date    PSA 1.20 07/30/2020     Test recommended and ordered    Colon Cancer Screen  Last colonoscopy: 4/15/14  Repeat every 10 years-- Due 2024   Diabetes Screen  Glucose (mg/dL)   Date Value   07/30/2020 108 (H)    Test recommended and ordered   Cholesterol Screen  Lab Results   Component Value Date    CHOL 191 07/30/2020    TRIG 87 07/30/2020    HDL 73 (H) 07/30/2020    LDLCALC 101 (H) 07/30/2020    Test recommended and ordered   HIV screening recommended for those ages 12-76 NO MATTER THE RISK FOR HIV--  3/14/2017  No need to repeat at this time    Hepatitis C screening recommended for those born between [facility]-- 3/14/2017  No need to repeat at this time    Aspirin for Cardiovascular Prevention   Yes Continue Baby Aspirin Daily     Recommended Immunizations    Immunization History   Administered Date(s) Administered    COVID-19, Moderna, PF, 100mcg/0.5mL 03/19/2021, 04/16/2021    Influenza Virus Vaccine 02/09/2016, 12/13/2017    Influenza, Quadv, IM, PF (6 mo and older Fluzone, Flulaval, Fluarix, and 3 yrs and older Afluria) 10/15/2018    Pneumococcal Polysaccharide ([id number]) 05/10/2018    Tdap (Boostrix, Adacel) 01/29/2013    Zoster Live (Zostavax) 12/19/2018    Zoster Recombinant (Shingrix) 02/27/2019    Influenza vaccine: recommended every fall    Pneumonia vaccine: due at age 72     Shingles v    Tetanus vaccine: tetanus and diptheria vaccine (Td/Tdap) recommended every 10 years- Td due 2023    Hepatitis B vaccine--due now             1. Use sunscreen daily to help reduce the risk of skin cancer. 2.  Obtain your diabetic retinal eye exam annually. 3. Always wear a seatbelt while in a car  4. Wear a helmet if riding a bike or motorcycle  5.  Continue Lifestyle modification including low calorie diet focusing on Low fat/low cholesterol and low carbohydrate intake, along with  increasing cardiovascular (aerobic) exercise. 6.  Recommend hepatitis B vaccination series. See reading material       Here are a few  Reliable websites with a variety of health and wellness information:   www.mylifecheck. heart. org     www.nutritionsource. org     www. americanheart. org     www. diabetes. org     www.[facility]     www.LiveGO ([facility] site)        Patient Education        Prostate Cancer Screening: Care Instructions  Your Care Instructions     Prostate cancer is the abnormal growth of cells in the prostate gland. This is an organ found just below a man's bladder. Screening can help find prostate cancer early. When it is found and treated early, the cancer may be cured. But it's not always treated. That's because the treatments can cause serious side effects. For most men, prostate cancer won't shorten their lives, especially if they are older and the cancer is growing slowly. Prostate cancer is the second most common type of cancer in men. Most cases occur in men older than 72. The disease runs in families. And it's more common in -American men. Follow-up care is a key part of your treatment and safety. Be sure to make and go to all appointments, and call your doctor if you are having problems. It's also a good idea to know your test results and keep a list of the medicines you take. What is the screening test for prostate cancer? The main screening test for prostate cancer is the prostate-specific antigen (PSA) test. This is a blood test that measures how much PSA is in your blood. A high level may mean that you have an enlarged prostate, an infection, or cancer. Along with the PSA test, you may have a digital (finger) rectal exam. This exam checks for anything abnormal in your prostate. To do the exam, the doctor puts a lubricated, gloved finger into your rectum.   If these tests suggest cancer, you may need a prostate biopsy. How is prostate cancer diagnosed? In a biopsy, the doctor takes small tissue samples from your prostate gland. Another doctor then looks at the tissue under a microscope to see if there are cancer cells, signs of infection, or other problems. The results help diagnose prostate cancer. What are the pros and cons of screening? Neither a PSA test nor a digital rectal exam can tell you for sure that you do or do not have cancer. But they can help you decide if you need more tests, such as a prostate biopsy. Screening tests may be useful because most men with prostate cancer don't have symptoms. It can be hard to know if you have cancer until it is more advanced. And then it's harder to treat. But having a PSA test can also cause harm. The test may show high levels of PSA that aren't caused by cancer. So you could have a prostate biopsy you didn't need. Or the PSA test might be normal when there is cancer, so a cancer might not be found early. The test can also find cancers that would never have caused a problem during your lifetime. So you might have treatment that was not needed. Prostate cancer usually develops late in life and grows slowly. For many men, it does not shorten their lives. Some experts advise screening only for men who are at high risk. Talk with your doctor to see if screening is right for you. Where can you learn more? Go to https://Atlas Guidesjesenia.Socialinus. org and sign in to your nTAG Interactive account. Enter R550 in the [employer] box to learn more about \"Prostate Cancer Screening: Care Instructions. \"     If you do not have an account, please click on the \"Sign Up Now\" link. Current as of: December 17, 2020               Content Version: 12.9  © 6969-7991 [employer]. Care instructions adapted under license by [facility] ([facility]).  If you have questions about a medical condition or this instruction, always ask your healthcare professional. [address] any warranty or liability for your use of this information. Patient Education        Athlete's Foot: Care Instructions  Your Care Instructions     Athlete's foot is an itchy rash on the foot caused by an infection with a fungus. You can get it by going barefoot in wet public areas, such as swimming pools or locker rooms. Many times there is no clear reason why you get athlete's foot. You can easily treat athlete's foot by putting medicine on your feet for 1 to 6 weeks. In some cases, a doctor may prescribe pills to kill the fungus. Follow-up care is a key part of your treatment and safety. Be sure to make and go to all appointments, and call your doctor if you are having problems. It's also a good idea to know your test results and keep a list of the medicines you take. How can you care for yourself at home? · Your doctor may suggest an over-the counter lotion or spray or may prescribe a medicine. Take your medicines exactly as prescribed. Call your doctor if you think you are having a problem with your medicine. · Keep your feet clean and dry. · When you get dressed, put your socks on before your underwear. This can prevent the fungus from spreading from your feet to your groin. To prevent athlete's foot  · Wear flip-flops or other shower sandals in public locker rooms and showers and by the pool. · Dry between your toes after swimming or bathing. · Wear leather shoes or sandals, which let air get to your feet. · Change your socks as needed so your feet stay as dry as possible. · Use antifungal powder on your feet. When should you call for help? Watch closely for changes in your health, and be sure to contact your doctor if:    · You do not get better as expected. Where can you learn more? Go to https://chlesaeb.healthYeePaypartners. org and sign in to your [email or web address] account.  Enter M498 in the Digital Theatre box to learn more about \"Athlete's Foot: Care Instructions. \"     If you do not have an account, please click on the \"Sign Up Now\" link. Current as of: March 3, 2021               Content Version: 12.9  © 2006-2021 [employer]rawberry energy. Care instructions adapted under license by [facility] ([facility]). If you have questions about a medical condition or this instruction, always ask your healthcare professional. [place] [address] any warranty or liability for your use of this information. Patient Education        Hepatitis B Vaccine: What You Need to Know  Why get vaccinated? Hepatitis B vaccine can prevent hepatitis B. Hepatitis B is a liver disease that can cause mild illness lasting a few weeks, or it can lead to a serious, lifelong illness. · Acute hepatitis B infection is a short-term illness that can lead to fever, fatigue, loss of appetite, nausea, vomiting, jaundice (yellow skin or eyes, dark urine, helen-colored bowel movements), and pain in the muscles, joints, and stomach. · Chronic hepatitis B infection is a long-term illness that occurs when the hepatitis B virus remains in a person's body. Most people who go on to develop chronic hepatitis B do not have symptoms, but it is still very serious and can lead to liver damage (cirrhosis), liver cancer, and death. Chronically-infected people can spread hepatitis B virus to others, even if they do not feel or look sick themselves. Hepatitis B is spread when blood, semen, or other body fluid infected with the hepatitis B virus enters the body of a person who is not infected.  People can become infected through:  · Birth (if a mother has hepatitis B, her baby can become infected)  · Sharing items such as razors or toothbrushes with an infected person  · Contact with the blood or open sores of an infected person  · Sex with an infected partner  · Sharing needles, syringes, or other drug-injection equipment  · Exposure to blood from needlesticks or other sharp instruments  Most people who are vaccinated with hepatitis B vaccine are immune for life. Hepatitis B vaccine  Hepatitis B vaccine is usually given as 2, 3, or 4 shots. Infants should get their first dose of hepatitis B vaccine at birth and will usually complete the series at 10months of age (sometimes it will take longer than 6 months to complete the series). Children and adolescents younger than 23years of age who have not yet gotten the vaccine should also be vaccinated. Hepatitis B vaccine is also recommended for certain unvaccinated adults:  · People whose sex partners have hepatitis B  · Sexually active persons who are not in a long-term monogamous relationship  · Persons seeking evaluation or treatment for a sexually transmitted disease  · Men who have sexual contact with other men  · People who share needles, syringes, or other drug-injection equipment  · People who have household contact with someone infected with the hepatitis B virus  · Health care and public safety workers at risk for exposure to blood or body fluids  · Residents and staff of facilities for developmentally disabled persons  · Persons in correctional facilities  · Victims of sexual assault or abuse  · Travelers to regions with increased rates of hepatitis B  · People with chronic liver disease, kidney disease, HIV infection, infection with hepatitis C, or diabetes  · Anyone who wants to be protected from hepatitis B  Hepatitis B vaccine may be given at the same time as other vaccines. Talk with your health care provider  Tell your vaccine provider if the person getting the vaccine:  · Has had an allergic reaction after a previous dose of hepatitis B vaccine, or has any severe, life-threatening allergies. In some cases, your health care provider may decide to postpone hepatitis B vaccination to a future visit. People with minor illnesses, such as a cold, may be vaccinated.  People who are moderately or severely ill should usually wait until they recover before getting hepatitis B vaccine. Your health care provider can give you more information. Risks of a vaccine reaction  · Soreness where the shot is given or fever can happen after hepatitis B vaccine. People sometimes faint after medical procedures, including vaccination. Tell your provider if you feel dizzy or have vision changes or ringing in the ears. As with any medicine, there is a very remote chance of a vaccine causing a severe allergic reaction, other serious injury, or death. What if there is a serious problem? An allergic reaction could occur after the vaccinated person leaves the clinic. If you see signs of a severe allergic reaction (hives, swelling of the face and throat, difficulty breathing, a fast heartbeat, dizziness, or weakness), call 9-1-1 and get the person to the nearest hospital.  For other signs that concern you, call your health care provider. Adverse reactions should be reported to the Vaccine Adverse Event Reporting System (VAERS). Your health care provider will usually file this report, or you can do it yourself. Visit the VAERS website at www.vaers. hhs.gov or call 8-557.458.5184. VAERS is only for reporting reactions, and VAERS staff do not give medical advice. The National Vaccine Injury Compensation Program  The National Vaccine Injury Compensation Program (VICP) is a federal program that was created to compensate people who may have been injured by certain vaccines. Visit the VICP website at www.hrsa.gov/vaccinecompensation or call 5-921.411.8202 to learn about the program and about filing a claim. There is a time limit to file a claim for compensation. How can I learn more? · Ask your healthcare provider. · Call your local or state health department. · Contact the Centers for Disease Control and Prevention (CDC):  ? Call 3-308.176.5514 (1-842-AAC-INFO) or  ? Visit CDC's website at www.cdc.gov/vaccines.   Vaccine Information Statement (Interim)  Hepatitis B Vaccine  8/15/2019  42 U. S.C. § 300aa-26  [place]. S. Department of Health and Human Services  Centers for Disease Control and Prevention  Many Vaccine Information Statements are available in Maltese and other languages. See www.immunize.org/vis. Hojas de información sobre vacunas están disponibles en español y en otros idiomas. Visite www.immunize.org/vis. Care instructions adapted under license by [facility] ([facility]). If you have questions about a medical condition or this instruction, always ask your healthcare professional. [place] [address] any warranty or liability for your use of this information. Patient Education        Diabetes Foot Health: Care Instructions  Your Care Instructions     When you have diabetes, your feet need extra care and attention. Diabetes can damage the nerve endings and blood vessels in your feet, making you less likely to notice when your feet are injured. Diabetes also limits your body's ability to fight infection and get blood to areas that need it. If you get a minor foot injury, it could become an ulcer or a serious infection. With good foot care, you can prevent most of these problems. Caring for your feet can be quick and easy. Most of the care can be done when you are bathing or getting ready for bed. Follow-up care is a key part of your treatment and safety. Be sure to make and go to all appointments, and call your doctor if you are having problems. It's also a good idea to know your test results and keep a list of the medicines you take. How can you care for yourself at home? · Keep your blood sugar close to normal by watching what and how much you eat, monitoring blood sugar, taking medicines if prescribed, and getting regular exercise. · Do not smoke. Smoking affects blood flow and can make foot problems worse. If you need help quitting, talk to your doctor about stop-smoking programs and medicines.  These can increase your chances of quitting for good. · Eat a diet that is low in fats. High fat intake can cause fat to build up in your blood vessels and decrease blood flow. · Inspect your feet daily for blisters, cuts, cracks, or sores. If you cannot see well, use a mirror or have someone help you. · Take care of your feet:  ? Wash your feet every day. Use warm (not hot) water. Check the water temperature with your wrists or other part of your body, not your feet. ? Dry your feet well. Pat them dry. Do not rub the skin on your feet too hard. Dry well between your toes. If the skin on your feet stays moist, bacteria or a fungus can grow, which can lead to infection. ? Keep your skin soft. Use moisturizing skin cream to keep the skin on your feet soft and prevent calluses and cracks. But do not put the cream between your toes, and stop using any cream that causes a rash. ? Clean underneath your toenails carefully. Do not use a sharp object to clean underneath your toenails. Use the blunt end of a nail file or other rounded tool. ? Trim and file your toenails straight across to prevent ingrown toenails. Use a nail clipper, not scissors. Use an emery board to smooth the edges. · Change socks daily. Socks without seams are best, because seams often rub the feet. You can find socks for people with diabetes from specialty catalogs. · Look inside your shoes every day for things like gravel or torn linings, which could cause blisters or sores. · Buy shoes that fit well:  ? Look for shoes that have plenty of space around the toes. This helps prevent bunions and blisters. ? Try on shoes while wearing the kind of socks you will usually wear with the shoes. ? Avoid plastic shoes. They may rub your feet and cause blisters. Good shoes should be made of materials that are flexible and breathable, such as leather or cloth. ? Break in new shoes slowly by wearing them for no more than an hour a day for several days.  Take extra time to check your feet Many restaurants use larger plates. · Get used to using the plate format at home. Then you can use it when you eat out. · Write down your questions about using the plate format. Talk to your doctor, a dietitian, or a diabetes educator about your concerns. Carbohydrate counting  With carbohydrate counting, you plan meals based on the amount of carbohydrate in each food. Carbohydrate raises blood sugar higher and more quickly than any other nutrient. It is found in desserts, breads and cereals, and fruit. It's also found in starchy vegetables such as potatoes and corn, grains such as rice and pasta, and milk and yogurt. Spreading carbohydrate throughout the day helps keep your blood sugar levels within your target range. Your daily amount depends on several things, including your weight, how active you are, which diabetes medicines you take, and what your goals are for your blood sugar levels. A registered dietitian or diabetes educator can help you plan how much carbohydrate to include in each meal and snack. A guideline for your daily amount of carbohydrate is:  · 45 to 60 grams at each meal. That's about the same as 3 to 4 carbohydrate servings. · 15 to 20 grams at each snack. That's about the same as 1 carbohydrate serving. The Nutrition Facts label on packaged foods tells you how much carbohydrate is in a serving of the food. First, look at the serving size on the food label. Is that the amount you eat in a serving? All of the nutrition information on a food label is based on that serving size. So if you eat more or less than that, you'll need to adjust the other numbers. Total carbohydrate is the next thing you need to look for on the label. If you count carbohydrate servings, one serving of carbohydrate is 15 grams. For foods that don't come with labels, such as fresh fruits and vegetables, you'll need a guide that lists carbohydrate in these foods.  Ask your doctor, dietitian, or diabetes educator about books or other nutrition guides you can use. If you take insulin, you need to know how many grams of carbohydrate are in a meal. This lets you know how much rapid-acting insulin to take before you eat. If you use an insulin pump, you get a constant rate of insulin during the day. So the pump must be programmed at meals to give you extra insulin to cover the rise in blood sugar after meals. When you know how much carbohydrate you will eat, you can take the right amount of insulin. Or, if you always use the same amount of insulin, you need to make sure that you eat the same amount of carbohydrate at meals. If you need more help to understand carbohydrate counting and food labels, ask your doctor, dietitian, or diabetes educator. How can you plan healthy meals? Here are some tips to get started:  · Plan your meals a week at a time. Don't forget to include snacks too. · Use cookbooks or online recipes to plan several main meals. Plan some quick meals for busy nights. You also can double some recipes that freeze well. Then you can save half for other busy nights when you don't have time to cook. · Make sure you have the ingredients you need for your recipes. If you're running low on basic items, put these items on your shopping list too. · List foods that you use to make breakfasts, lunches, and snacks. List plenty of fruits and vegetables. · Post this list on the refrigerator. Add to it as you think of more things you need. · Take the list to the store to do your weekly shopping. Follow-up care is a key part of your treatment and safety. Be sure to make and go to all appointments, and call your doctor if you are having problems. It's also a good idea to know your test results and keep a list of the medicines you take. Where can you learn more? Go to https://nghia.JuiceBox Games. org and sign in to your Powered by Peak account.  Enter G822 in the [facility] box to learn more about \"Learning About Meal Planning for Diabetes. \"     If you do not have an account, please click on the \"Sign Up Now\" link. Current as of: August 31, 2020               Content Version: 12.9  © 2006-2021 Healthwise, Incorporated. Care instructions adapted under license by [facility] ([facility]). If you have questions about a medical condition or this instruction, always ask your healthcare professional. [address] any warranty or liability for your use of this information. Patient Education        Learning About Low-Carbohydrate Diets  What is a low-carbohydrate diet? A low-carbohydrate (or \"low-carb\") diet limits foods and drinks that have carbohydrates. This includes grains, fruits, milk and yogurt, and starchy vegetables like potatoes, beans, and corn. It also avoids foods and drinks that have added sugar. Instead, low-carb diets include foods that are high in protein and fat. Why might you follow a low-carb diet? Low-carb diets may be used for a variety of reasons, such as for weight loss. People who have diabetes may use a low-carb diet to help manage their blood sugar levels. What should you do before you start the diet? Talk to your doctor before you try any diet. This is even more important if you have health problems like kidney disease, heart disease, or diabetes. Your doctor may suggest that you meet with a registered dietitian. A dietitian can help you make an eating plan that works for you. What foods do you eat on a low-carb diet? On a low-carb diet, you choose foods that are high in protein and fat. Examples of these are:  · Meat, poultry, and fish. · Eggs. · Nuts, such as walnuts, pecans, almonds, and peanuts. · Peanut butter and other nut butters. · Tofu. · Avocado. · Sal Gandhi. · Non-starchy vegetables like broccoli, cauliflower, green beans, mushrooms, peppers, lettuce, and spinach. · Unsweetened non-dairy milks like almond milk and coconut milk.   · Cheese, cottage cheese, and cream cheese. Current as of: December 17, 2020               Content Version: 12.9  © 2006-2021 [employer]. Care instructions adapted under license by [facility] ([facility]). If you have questions about a medical condition or this instruction, always ask your healthcare professional. [address] any warranty or liability for your use of this information. Patient Education        Learning About Low-Carbohydrate Foods  What foods are low in carbohydrate? The foods you eat contain nutrients, such as vitamins and minerals. Carbohydrate is a nutrient. Your body needs the right amount to stay healthy and work as it should. You can use the list below to help you make choices about which foods to eat. Some foods that are lower in carbohydrate include:  Dairy and dairy alternatives  · Cheese  · Cottage cheese  · Cream cheese  · Nut milk (unsweetened)  · Soy milk (unsweetened)  · Yogurt (Greek, plain)  Fruits  · Avocado  · [employer] and other protein foods  · Almonds  · Beef  · Chicken  · Cod  · Eggs  · Halibut  · Peanut butter and other nut butters  · Pistachios  · Pork  · Pumpkin seeds  · Tofu  · Trout  · [place]  · [place] ([facility])  · Walnuts  Vegetables  · Broccoli  · Carrots  · Cauliflower  · Green beans  · Mushrooms  · Peppers  · Salad greens  · Spinach  · Tomatoes  Work with your doctor to find out how much of this nutrient you need. Depending on your health, you may need more or less of it in your diet. Where can you learn more? Go to [email or web address]. org and sign in to your [employer] account. Enter [phone] in the [place] box to learn more about \"Learning About Low-Carbohydrate Foods. \"     If you do not have an account, please click on the \"Sign Up Now\" link. Current as of: December 17, 2020               Content Version: 12.9  © 2006-2021 [employer]. Care instructions adapted under license by [employer] ([facility]).  If you have questions about a medical condition or this instruction, always ask your healthcare professional. [place] [address] any warranty or liability for your use of this information. Patient Education        Well Visit, Men 48 to 72: Care Instructions  Overview     Well visits can help you stay healthy. Your doctor has checked your overall health and may have suggested ways to take good care of yourself. Your doctor also may have recommended tests. At home, you can help prevent illness with healthy eating, regular exercise, and other steps. Follow-up care is a key part of your treatment and safety. Be sure to make and go to all appointments, and call your doctor if you are having problems. It's also a good idea to know your test results and keep a list of the medicines you take. How can you care for yourself at home? · Get screening tests that you and your doctor decide on. Screening helps find diseases before any symptoms appear. · Eat healthy foods. Choose fruits, vegetables, whole grains, protein, and low-fat dairy foods. Limit fat, especially saturated fat. Reduce salt in your diet. · Limit alcohol. Have no more than 2 drinks a day or 14 drinks a week. · Get at least 30 minutes of exercise on most days of the week. Walking is a good choice. You also may want to do other activities, such as running, swimming, cycling, or playing tennis or team sports. · Reach and stay at a healthy weight. This will lower your risk for many problems, such as obesity, diabetes, heart disease, and high blood pressure. · Do not smoke. Smoking can make health problems worse. If you need help quitting, talk to your doctor about stop-smoking programs and medicines. These can increase your chances of quitting for good. · Care for your mental health. It is easy to get weighed down by worry and stress. Learn strategies to manage stress, like deep breathing and mindfulness, and stay connected with your family and community.  If Health. If you have questions about a medical condition or this instruction, always ask your healthcare professional. [place] [address] any warranty or liability for your use of this information.

## 2021-08-19 NOTE — PROGRESS NOTES
[facility]) Physicians  Internal Medicine  Patient Encounter  Dexter Mendoza D.O., [facility] Preventative Physical    Chief Complaint   Patient presents with    Annual Exam       HPI-- 63 y.o. male presents today for a complete annual physical.     DM-- Pt has not been checking blood sugars. He states he had his eye exam.  He is due for U. MALB/Cr. He refuses to take statin. He understands the guidelines and risks and benefits. He does not eat as much. He does not eat much sweets, but may have juice. Medical/Surgical Histories     Past Medical History:   Diagnosis Date    HNP (herniated nucleus pulposus), cervical 2009    Dr. King Hyatt Impaired fasting glucose     Psoriasis     Dr. Phoenix Srivastava    Type 2 diabetes mellitus without complication, without long-term current use [facility] 5/10/2018          No past surgical history on file. Medications/Allergies     Medication Sig   aspirin EC 81 MG EC tablet Take 1 tablet by mouth daily. No Known Allergies      Substance Use History     Social History     Tobacco Use    Smoking status: Never Smoker    Smokeless tobacco: Never Used   Vaping Use    Vaping Use: Never used   Substance Use Topics    Alcohol use: No    Drug use: No      [employer]-- [employer]    Family History     Family History   Problem Relation Age of Onset    High Blood Pressure Mother     Diabetes Mother     Diabetes Father     Heart Disease Father 77        CAD, CABG    High Blood Pressure Father     Kidney Disease Brother         stone    High Blood Pressure Brother     Obesity Brother     Heart Disease Brother         CHF    Heart Disease Brother 47        CAD, PTCA with stent, CHF    Diabetes Brother     Hypertension Brother               REVIEW OF SYSTEMS:    CONSTITUTIONAL:  Neg  fatigue, fever, chills or night sweats, anorexia, Sleep difficulties. Weight is stable.     EYES: Neg  Blurry vision, loss of vision, double vision, tearing, itching, eye pain. + Glasses. EARS:  Neg Hearing loss, tinnitus, vertigo, discharge or otalgia. NOSE:  Neg  itching, epistaxis, or snoring. No nasal congestion, rhinorrhea. MOUTH/THROAT:  Neg Bleeding gums, hoarseness, sore throat, dysphagia, throat infections, or dentures. RESPIRATORY:  Neg SOB ,wheeze, sputum, hemoptysis. No snoring. CARDIOVASCULAR:  Neg Chest pain, palpitations, heart murmur, dyspnea on exertion, orthopnea, paroxysmal nocturnal dyspnea or edema of extremities, or claudication. Coronary Calcium Score 4/28/2019---> Zero  GASTROINTESTINAL:  Neg   Nausea, vomiting, or diarrhea, constipation,  hematemesis, heart burn, dysphagia,change in bowel movements or stool caliber, hematochezia, melena, abdominal pain, or food intolerance. Colonoscopy: 2014, 10 year recall. GENITOURINARY:  Neg  Urinary frequency, hesitancy, urgency, polyuria, dysuria, hematuria, nocturia, incontinence, change in stream, genital pain or swelling, kidney stones, STD's. VARSHA: Yes, PSA: Yes  HEMATOLOGIC/LYMPHATIC:  Neg  Anemia, bleeding dyscrasias, easy bruising, blood clots (DVT/PE), transfusions, or enlarged lymph nodes. MUSCULOSKELETAL:  Neg  New myalgias, joint pain, bone pain, joint swelling, low back pain, neck pain, radicular pain, or fractures. NEUROLOGICAL:  Neg  Loss of Consciousness, memeory loss or forgetfulness, confusion, difficulty concentrating, seizures, insomina, aphasia or dysarthria, unilateral weakness or paresthesias, ataxia, headaches, syncope, tremor, or H/O head trauma. PSYCHIATRIC:  Neg  Depression, anxiety, personality changes, nervousness, drug or alcohol use/abuse, H/O psych counseling: No, Psychotropics: No  SKIN :  Neg  nail changes, sun burns, tattoos, change in moles, or skin color changes. Under the care of dermatologist for psoriasis (Dr. Manolo Rao).   He has not been seen in a year  ENDOCRINE:  Neg  Polydipsia,polyuria,abnormal weight changes,heat /cold intolerance, Hair changes. H/O Diabetes. Preventive Care:    Health Maintenance   Topic Date Due    Hepatitis B vaccine (1 of 3 - Risk 3-dose series) Never done    Shingles Vaccine (3 of 3) 04/24/2019    Diabetic retinal exam  12/08/2019    Diabetic foot exam  07/30/2021    A1C test (Diabetic or Prediabetic)  07/30/2021    Diabetic microalbuminuria test  07/30/2021    Lipid screen  07/30/2021    Flu vaccine (1) 09/01/2021    DTaP/Tdap/Td vaccine (2 - Td or Tdap) 01/29/2023    Colon cancer screen colonoscopy  04/15/2024    Pneumococcal 0-64 years Vaccine (2 of 2 - PPSV23) 10/17/2029    COVID-19 Vaccine  Completed    Hepatitis C screen  Completed    HIV screen  Completed    Hepatitis A vaccine  Aged Out    Hib vaccine  Aged Out    Meningococcal (ACWY) vaccine  Aged Out      Self-testicular exams: Yes  Sexual activity: single partner, wife. Last eye exam: 12/2018, glasses. Exercise: Exercising 4 times per week-- 30-45 minutes cardio, 30 minutes light weights. Seatbelt use: Yes  Sunscreen Use: Yes  Dentist:  Every 6 months, UTD    Physical Exam    Vitals:    08/19/21 0825 08/19/21 0854   BP: (!) 138/92 130/78   Pulse: 100    Resp: 16    SpO2: 98%    Weight: (!) 305 lb (138.3 kg)    Height: 6' (1.829 m)      Body mass index is 41.37 kg/m². Wt Readings from Last 3 Encounters:   08/19/21 (!) 305 lb (138.3 kg)   07/30/20 (!) 306 lb (138.8 kg)   08/29/19 (!) 309 lb (140.2 kg)     BP Readings from Last 3 Encounters:   08/19/21 130/78   07/30/20 127/87   08/29/19 138/84          GEN:  63 y.o. male who is in NAD, A&O. He appears stated age and well nourished. He is cooperative and pleasant. Morbidly obese  HEAD:  NC/AT, no lesions. EYES:  JERMAINE, EOMI, No scleral icterus or conjunctival injection or discharge. Visual fields in tact to confrontation. EARS:  EAC's clear, TM's normal.  NOSE:  Nasal cavity is clear. No mucosal congestion or discharge. Sinuses are nontender.   MOUTH & THROAT:  Oral cavity is clear without mucosal lesions. Tongue is midline. Dentition is in good repair. No pharyngeal erythema or exudate. NECK:  Supple. Full ROM. Trachea is midline. No increased JVD. No thyromegaly or nodules. No masses  LYMPH: No C/SC/A/F nodes  CARDIAC: Heart rhythm is regular. No murmurs. No ectopy. Rate is normal.  VASC:  Pedal pulses 2/4. Carotid upstrokes 2+. No bruits noted. PULM:  Lungs are CTA. Symmetric breath sounds noted. AP Diameter NL. No wheezing or forced expiratory wheezing. GI:  Abdomen is soft and nontender. No distension. No organomegaly. No masses. No pulsatile masses. :   External genitalia NL. No testicular or scrotal masses. Penile shaft is NL. No inguinal hernias  BREAST:  No masses. EXT:  No Cyanosis or clubbing. No edema. SKIN: Warm and dry, normal turgor, + Psoriatic plaques BL knees, elbows. Still with macerated moist skin in between toes. No other lesions or ulcerations. No calluses. NEURO:  Cranial nerves 2-12 are NL. Speech fluent and coherent. Strength is 5/5 in all muscle groups. No sensory deficits. No focal or lateralizing deficits. Reflexes 2/4 and symmetric. Monofilament sensation is normal in both feet   MS:  No C/T/L paraspinal tenderness. No scoliosis. No joint effusions. Full joint ROM. PSYCH:  Mood and affect NL. Judgement and insight NL.          ASSESSMENT/PLAN:    1. Annual physical exam  All care gaps identified and addressed  Hepatitis B #1 today. Hepatitis B #2 in 1 month, hepatitis B #3 in 6 months  Counseled on lifestyle modifications.  - Hep B Vaccine Adult (ENGERIX-B)  - CBC Auto Differential; Future  - Comprehensive Metabolic Panel; Future  - Lipid Panel; Future  - TSH without Reflex; Future  - Hemoglobin A1C; Future  - PSA screening; Future  - Vitamin D 25 Hydroxy; Future    2.  Type 2 diabetes mellitus without complication, without long-term current use of insulin (HCC)  Condition control and stability are unknown at this time. He is overdue for lab and urine testing. Hepatitis B vaccination series started today  - Hep B Vaccine Adult (ENGERIX-B)  - Microalbumin / Creatinine Urine Ratio  - Comprehensive Metabolic Panel; Future  - Lipid Panel; Future  - Hemoglobin A1C; Future    3. Hyperlipidemia, unspecified hyperlipidemia type    - Comprehensive Metabolic Panel; Future  - Lipid Panel; Future    4. Screening for prostate cancer    - PSA screening; Future    5. Need for hepatitis B vaccination    - Hep B Vaccine Adult (ENGERIX-B)    6. Cough  Patient occasionally has a cough. [employer] worked well. He denies any wheezing or shortness of breath. - benzonatate (TESSALON) 200 MG capsule; Take 1 capsule by mouth 3 times daily as needed for Cough  Dispense: 30 capsule; Refill: 0    7. Tinea pedis of both feet  Lamisil cream or spray    8. Vitamin D deficiency    - Vitamin D 25 Hydroxy;  Future             Preventive plan of care for [place] Armas        8/19/2021           Preventive Measures Status       Recommendations for screening   Prostate Cancer Screen  Lab Results   Component Value Date    PSA 1.20 07/30/2020     Test recommended and ordered    Colon Cancer Screen  Last colonoscopy: 4/15/14  Repeat every 10 years-- Due 2024   Diabetes Screen  Glucose (mg/dL)   Date Value   07/30/2020 108 (H)    Test recommended and ordered   Cholesterol Screen  Lab Results   Component Value Date    CHOL 191 07/30/2020    TRIG 87 07/30/2020    HDL 73 (H) 07/30/2020    LDLCALC 101 (H) 07/30/2020    Test recommended and ordered   HIV screening recommended for those ages 12-76 NO MATTER THE RISK FOR HIV--  3/14/2017  No need to repeat at this time    Hepatitis C screening recommended for those born between [facility]-- 3/14/2017  No need to repeat at this time    Aspirin for Cardiovascular Prevention   Yes Continue Baby Aspirin Daily     Recommended Immunizations    Immunization History   Administered

## 2021-08-23 ENCOUNTER — TELEPHONE (OUTPATIENT)
Dept: INTERNAL MEDICINE CLINIC | Age: 56
End: 2021-08-23

## 2021-08-23 NOTE — TELEPHONE ENCOUNTER
Patient is returning call from our office. Please call  If anyone aware of what call was pertaining to.

## 2021-08-23 NOTE — TELEPHONE ENCOUNTER
Left message on voicemail for patient to call the office back. Please see result note documentation  Notify pt lab tests show diabetes is under control.   A1C is 6.8%, goal is less than 7%.     Also, cholesterol remains too high for someone that has diabetes.  Statin therapy strongly recommended to help reduce the risk og cardiovascular disease.     Finally, Vitamin D level is low.  Recommend Vitamin D3 1000 units daily.  He can get this over the counter.

## 2021-08-23 NOTE — TELEPHONE ENCOUNTER
----- Message from Bartolome Ortiz sent at 8/23/2021 10:32 AM EDT -----  Subject: Message to Provider    QUESTIONS  Information for Provider? please call back on cell phone   ---------------------------------------------------------------------------  --------------  [address]  What is the best way for the office to contact you? OK to leave message on   voicemail  Preferred Call Back Phone Number? [phone]  ---------------------------------------------------------------------------  --------------  SCRIPT ANSWERS  Relationship to Patient?  Self

## 2021-08-23 NOTE — TELEPHONE ENCOUNTER
Patient is returning a call from Jay Cesar. He states he did not receive his lab results. Please call him to discuss.

## 2021-09-20 ENCOUNTER — NURSE ONLY (OUTPATIENT)
Dept: INTERNAL MEDICINE CLINIC | Age: 56
End: 2021-09-20
Payer: COMMERCIAL

## 2021-09-20 PROCEDURE — 90746 HEPB VACCINE 3 DOSE ADULT IM: CPT | Performed by: INTERNAL MEDICINE

## 2021-09-20 PROCEDURE — 90471 IMMUNIZATION ADMIN: CPT | Performed by: INTERNAL MEDICINE

## 2021-10-07 ENCOUNTER — TELEPHONE (OUTPATIENT)
Dept: INTERNAL MEDICINE CLINIC | Age: 56
End: 2021-10-07

## 2021-10-07 NOTE — TELEPHONE ENCOUNTER
----- Message from Lincoln Eugene sent at 10/7/2021  3:20 PM EDT -----  Subject: Message to Provider    QUESTIONS  Information for Provider? Pt went to wellness clinic at [place] [address] from   Cough/cold symptoms. Pt was instructed to flush nose and take OTC Allergy   meds . Pt feels like this course of treatment will not help him. Pt has   constant drainage and frequent urination. PT would like to set up appt   with  or have Dr give him a script.   ---------------------------------------------------------------------------  --------------  [address]  What is the best way for the office to contact you? OK to leave message on   voicemail  Preferred Call Back Phone Number? [phone]  ---------------------------------------------------------------------------  --------------  SCRIPT ANSWERS  Relationship to Patient? Self  Are you currently unable to finish sentences due to any difficulty   breathing? No  Are you unable to swallow liquids? No  Are you having fevers (100.4 or greater), chills, or sweats? No  Do you have COPD, asthma or a chronic lung condition? No  Have your symptoms been present for more than 5 days? No  Have you recently (14 days) been seen by a provider for this issue?  Yes

## 2021-10-09 ENCOUNTER — VIRTUAL VISIT (OUTPATIENT)
Dept: INTERNAL MEDICINE CLINIC | Age: 56
End: 2021-10-09
Payer: COMMERCIAL

## 2021-10-09 DIAGNOSIS — J06.9 URI WITH COUGH AND CONGESTION: Primary | ICD-10-CM

## 2021-10-09 PROCEDURE — 99213 OFFICE O/P EST LOW 20 MIN: CPT | Performed by: INTERNAL MEDICINE

## 2021-10-09 RX ORDER — TRIAMCINOLONE ACETONIDE 55 UG/1
2 SPRAY, METERED NASAL NIGHTLY
Qty: 1 EACH | Refills: 0 | Status: SHIPPED | OUTPATIENT
Start: 2021-10-09 | End: 2022-02-17 | Stop reason: ALTCHOICE

## 2021-10-09 RX ORDER — GUAIFENESIN AND DEXTROMETHORPHAN HYDROBROMIDE 600; 30 MG/1; MG/1
1 TABLET, EXTENDED RELEASE ORAL 2 TIMES DAILY
Qty: 28 TABLET | Refills: 0 | Status: SHIPPED | OUTPATIENT
Start: 2021-10-09 | End: 2022-02-17 | Stop reason: ALTCHOICE

## 2021-10-09 RX ORDER — AZITHROMYCIN 250 MG/1
250 TABLET, FILM COATED ORAL SEE ADMIN INSTRUCTIONS
Qty: 6 TABLET | Refills: 0 | Status: SHIPPED | OUTPATIENT
Start: 2021-10-09 | End: 2021-10-14

## 2021-10-09 NOTE — PROGRESS NOTES
10/9/2021    [facility]) Physicians  Internal Medicine  Patient Encounter  Cortes Cade D.O., [address] -- Audio/Visual (During -72 public health emergency)      I discussed at this telephone/video visit is a nontraditional type of visit that we are conducting in lieu of an office visit to minimize patient risk during the coronavirus pandemic. I discussed with the patient that I would not be able to perform a full physical examination, but that in most other respects the visit would be beneficial to his/her continued medical care. He/She again gave verbal consent for us to conduct this type of visit. Chief Complaint   Patient presents with    Nasal Congestion    Cough     dry, x 4 days          HPI:    Ntahan Good (:  1965) has requested an audio/video evaluation for the following concern(s): Cough, cold    63 y.o. male being evaluated via virtual video visit due to the coronavirus pandemic emergency and public health crisis and inability to see the patient face-to-face in the office. Pt being evaluated today with C/O nasal congestion, cough. Symptoms started upon waking this past Monday (5 days ago). Patient woke with sinus pressure. The next day he developed a runny nose and dry cough. He describes postnasal drip and a tickle in the back of his throat. On Wednesday he noticed that his throat was sore. He denies any fever, chills, sweats. He denies any change in taste or smell. He denies any nausea, vomiting, diarrhea. He denies any shortness of breath or myalgias. Patient is vaccinated against COVID-19. The patient went to [facility] [place]. He was tested for COVID-19 via a rapid test.  This was negative per his report. The clinician who saw him told him to let the condition run its course but use Flonase, nasal saline flush and Claritin.   The patient has continued to use saline flushes but is not using Flonase due to previous bleeding. He is also not using the Claritin. He is using a leftover prescription for benzonatate which helps nicely with his cough. Patient is requesting an antibiotic because he thinks he is developing a sinus infection. Past Medical History:   Diagnosis Date    HNP (herniated nucleus pulposus), cervical 2009    Dr. Giacomo Armas Impaired fasting glucose     Psoriasis     Dr. Viral Montgomery    Type 2 diabetes mellitus without complication, without long-term current use of insulin ([facility] 5/10/2018       Prior to Visit Medications    Medication Sig Taking? Authorizing Provider   benzonatate (TESSALON) 200 MG capsule Take 1 capsule by mouth 3 times daily as needed for Cough Yes Walker Barron, DO   aspirin EC 81 MG EC tablet Take 1 tablet by mouth daily. Yes Yovanny Ortiz, DO         Review of Systems  As per HPI      PHYSICAL EXAMINATION:    Vital Signs: (As obtained by patient/caregiver or practitioner observation)    Patient-Reported Vitals 10/9/2021   Patient-Reported Weight 305#   Patient-Reported Height 6'         Wt Readings from Last 3 Encounters:   08/19/21 (!) 305 lb (138.3 kg)   07/30/20 (!) 306 lb (138.8 kg)   08/29/19 (!) 309 lb (140.2 kg)     BP Readings from Last 3 Encounters:   08/19/21 130/78   07/30/20 127/87   08/29/19 138/84           Physical Exam  Constitutional:       General: He is not in acute distress. Appearance: Normal appearance. He is not ill-appearing. Comments: Slight nasal quality to his voice   Eyes:      General: No scleral icterus. Conjunctiva/sclera: Conjunctivae normal.   Pulmonary:      Effort: Pulmonary effort is normal. No respiratory distress. Neurological:      Mental Status: He is alert and oriented to person, place, and time. Psychiatric:         Mood and Affect: Mood normal.         Thought Content:  Thought content normal.           Other pertinent observable physical exam findings-     Due to this being a TeleHealth encounter, evaluation of the following organ systems is limited: Vitals/Constitutional/EENT/Resp/CV/GI//MS/Neuro/Skin/Heme-Lymph-Imm. ASSESSMENT/PLAN:  1. URI with cough and congestion  Etiology is likely viral.  Recommended he not use the antibiotic unless no better after day 10  Also recommended he obtain a PCR Covid test.  Patient will call if he changes his mind about this. Advised that he stay isolated until after day 10 unless he has a PCR test  Advised that he push fluids  Advised that he call should symptoms persist, worsen or if new symptoms develop. Continue benzonatate for cough  - triamcinolone (NASACORT ALLERGY 24HR) 55 MCG/ACT nasal inhaler; 2 sprays by Nasal route nightly  Dispense: 1 each; Refill: 0  - Dextromethorphan-guaiFENesin (MUCINEX DM)  MG TB12; Take 1 tablet by mouth 2 times daily  Dispense: 28 tablet; Refill: 0  - azithromycin (ZITHROMAX) 250 MG tablet; Take 1 tablet by mouth See Admin Instructions for 5 days 500mg on day 1 followed by 250mg on days 2 - 5  Dispense: 6 tablet; Refill: 0      No follow-ups on file. An  electronic signature was used to authenticate this note. --Cortes Cade DO on 10/9/2021 at 10:42 AM    119}    Pursuant to the emergency declaration under the [facility]5 waiver authority and the [employer] Act, this Virtual  Visit was conducted, with patient's consent, to reduce the patient's risk of exposure to COVID-19 and provide continuity of care for an established patient. Services were provided through a video synchronous discussion virtually to substitute for in-person clinic visit.

## 2021-11-12 ENCOUNTER — TELEPHONE (OUTPATIENT)
Dept: INTERNAL MEDICINE CLINIC | Age: 56
End: 2021-11-12

## 2021-11-12 NOTE — TELEPHONE ENCOUNTER
Called to speak to the patient and after explaining why we can't see any sick patients in the office (that even the minute sysmptoms can be COVID and that out patient population is at great risk.) he put me on hold for over 5 minutes. I disconnected the call.   Will route to Dr Neel Donnelly for review

## 2021-11-12 NOTE — TELEPHONE ENCOUNTER
Patient is stating that he does not understand the doctors logic behind Not letting anyone come in the office with a Cough. He states he gets this cough yearly and doctor leonardo should be aware of this and be able to call in medication to the pharmacy. After explaining to the patient that these are the protocols for every patient and that we could offer him a VV he states he does not want to do this type of visit he would like to come in to be looked at. He will wait for doctor patterson to respond. So that he could possible speak with him about this.

## 2022-02-17 ENCOUNTER — OFFICE VISIT (OUTPATIENT)
Dept: INTERNAL MEDICINE CLINIC | Age: 57
End: 2022-02-17
Payer: COMMERCIAL

## 2022-02-17 VITALS
BODY MASS INDEX: 41.75 KG/M2 | DIASTOLIC BLOOD PRESSURE: 70 MMHG | HEIGHT: 73 IN | RESPIRATION RATE: 14 BRPM | WEIGHT: 315 LBS | HEART RATE: 93 BPM | SYSTOLIC BLOOD PRESSURE: 132 MMHG | OXYGEN SATURATION: 95 %

## 2022-02-17 DIAGNOSIS — E55.9 VITAMIN D DEFICIENCY: ICD-10-CM

## 2022-02-17 DIAGNOSIS — E11.9 TYPE 2 DIABETES MELLITUS WITHOUT COMPLICATION, WITHOUT LONG-TERM CURRENT USE OF INSULIN (HCC): Primary | ICD-10-CM

## 2022-02-17 DIAGNOSIS — R03.0 ELEVATED BP WITHOUT DIAGNOSIS OF HYPERTENSION: ICD-10-CM

## 2022-02-17 PROCEDURE — 90471 IMMUNIZATION ADMIN: CPT | Performed by: INTERNAL MEDICINE

## 2022-02-17 PROCEDURE — 99213 OFFICE O/P EST LOW 20 MIN: CPT | Performed by: INTERNAL MEDICINE

## 2022-02-17 PROCEDURE — 90746 HEPB VACCINE 3 DOSE ADULT IM: CPT | Performed by: INTERNAL MEDICINE

## 2022-02-17 NOTE — PATIENT INSTRUCTIONS
To do list:      #1 monitor your blood pressure at home twice daily and report readings in the next 2 to 3 weeks. Goal blood pressure is less than 130/80  Patient Education        Elevated Blood Pressure: Care Instructions  Your Care Instructions    Blood pressure is a measure of how hard the blood pushes against the walls of your arteries. It's normal for blood pressure to go up and down throughout the day. But if it stays up over time, you have high blood pressure. Two numbers tell you your blood pressure. The first number is the systolic pressure. It shows how hard the blood pushes when your heart is pumping. The second number is the diastolic pressure. It shows how hard the blood pushes between heartbeats, when your heart is relaxed and filling with blood. An ideal blood pressure in adults is less than 120/80 (say \"120 over 80\"). High blood pressure is 140/90 or higher. You have high blood pressure if your top number is 140 or higher or your bottom number is 90 or higher, or both. The main test for high blood pressure is simple, fast, and painless. To diagnose high blood pressure, your doctor will test your blood pressure at different times. After testing your blood pressure, your doctor may ask you to test it again when you are home. If you are diagnosed with high blood pressure, you can work with your doctor to make a long-term plan to manage it. Follow-up care is a key part of your treatment and safety. Be sure to make and go to all appointments, and call your doctor if you are having problems. It's also a good idea to know your test results and keep a list of the medicines you take. How can you care for yourself at home? · Do not smoke. Smoking increases your risk for heart attack and stroke. If you need help quitting, talk to your doctor about stop-smoking programs and medicines. These can increase your chances of quitting for good. · Stay at a healthy weight.   · Try to limit how much sodium you eat ability to fight infection and get blood to areas that need it. If you get a minor foot injury, it could become an ulcer or a serious infection. With good foot care, you can prevent most of these problems. Caring for your feet can be quick and easy. Most of the care can be done when you are bathing or getting ready for bed. Follow-up care is a key part of your treatment and safety. Be sure to make and go to all appointments, and call your doctor if you are having problems. It's also a good idea to know your test results and keep a list of the medicines you take. How can you care for yourself at home? · Keep your blood sugar close to normal by watching what and how much you eat, monitoring blood sugar, taking medicines if prescribed, and getting regular exercise. · Do not smoke. Smoking affects blood flow and can make foot problems worse. If you need help quitting, talk to your doctor about stop-smoking programs and medicines. These can increase your chances of quitting for good. · Eat a diet that is low in fats. High fat intake can cause fat to build up in your blood vessels and decrease blood flow. · Inspect your feet daily for blisters, cuts, cracks, or sores. If you cannot see well, use a mirror or have someone help you. · Take care of your feet:  ? Wash your feet every day. Use warm (not hot) water. Check the water temperature with your wrists or other part of your body, not your feet. ? Dry your feet well. Pat them dry. Do not rub the skin on your feet too hard. Dry well between your toes. If the skin on your feet stays moist, bacteria or a fungus can grow, which can lead to infection. ? Keep your skin soft. Use moisturizing skin cream to keep the skin on your feet soft and prevent calluses and cracks. But do not put the cream between your toes, and stop using any cream that causes a rash. ? Clean underneath your toenails carefully. Do not use a sharp object to clean underneath your toenails.  Use the blunt end of a nail file or other rounded tool. ? Trim and file your toenails straight across to prevent ingrown toenails. Use a nail clipper, not scissors. Use an emery board to smooth the edges. · Change socks daily. Socks without seams are best, because seams often rub the feet. You can find socks for people with diabetes from specialty catalogs. · Look inside your shoes every day for things like gravel or torn linings, which could cause blisters or sores. · Buy shoes that fit well:  ? Look for shoes that have plenty of space around the toes. This helps prevent bunions and blisters. ? Try on shoes while wearing the kind of socks you will usually wear with the shoes. ? Avoid plastic shoes. They may rub your feet and cause blisters. Good shoes should be made of materials that are flexible and breathable, such as leather or cloth. ? Break in new shoes slowly by wearing them for no more than an hour a day for several days. Take extra time to check your feet for red areas, blisters, or other problems after you wear new shoes. · Do not go barefoot. Do not wear sandals, and do not wear shoes with very thin soles. Thin soles are easy to puncture. They also do not protect your feet from hot pavement or cold weather. · Have your doctor check your feet during each visit. If you have a foot problem, see your doctor. Do not try to treat an early foot problem at home. Home remedies or treatments that you can buy without a prescription (such as corn removers) can be harmful. · Always get early treatment for foot problems. A minor irritation can lead to a major problem if not properly cared for early. When should you call for help?    Call your doctor now or seek immediate medical care if:    · You have a foot sore, an ulcer or break in the skin that is not healing after 4 days, bleeding corns or calluses, or an ingrown toenail.     · You have blue or black areas, which can mean bruising or blood flow problems.     · You have peeling skin or tiny blisters between your toes or cracking or oozing of the skin.     · You have a fever for more than 24 hours and a foot sore.     · You have new numbness or tingling in your feet that does not go away after you move your feet or change positions.     · You have unexplained or unusual swelling of the foot or ankle. Watch closely for changes in your health, and be sure to contact your doctor if:    · You cannot do proper foot care. Where can you learn more? Go to https://[employer].pepiceweb.S[employer]ake. org and sign in to your OpenRoute account. Enter A739 in the Veset box to learn more about \"Diabetes Foot Health: Care Instructions. \"     If you do not have an account, please click on the \"Sign Up Now\" link. Current as of: July 28, 2021               Content Version: 13.1  © 1341-8573 Healthwise, Incorporated. Care instructions adapted under license by [facility] ([facility]). If you have questions about a medical condition or this instruction, always ask your healthcare professional. [address] any warranty or liability for your use of this information.

## 2022-02-17 NOTE — PROGRESS NOTES
[facility]) Physicians  Internal Medicine  Patient Encounter  Radha Ryder D.O., [place]        Chief Complaint   Patient presents with   Jake Montgomery    Medication Check       HPI: 61 y.o. male seen today requesting a routine checkup regarding the status of current chronic medical problems as to below along with a medication review and reconciliation. He works at [employer]. She expressed the fact that he was not happy not being able to be seen in the office when he was sick back in the fall. He also indicated the cough medicine (benzonatate) was not helpful. The patient states that he was frustrated that no one can give him an answer as to what he had but he did understand that Covid was a concern. He did not have Covid. Patient was counseled today extensively on upper respiratory infections and the fact that most of these are caused by viruses that we do not test for. Diabetes Mellitus Type II, Follow-up--   Lab Results   Component Value Date    LABA1C 6.8 08/19/2021      Lab Results   Component Value Date    .5 08/19/2021   Patient denies any problems with polyuria or polydipsia. He said no blurry vision or weight changes. He has not required any oral medication. He has been treating his diabetes with lifestyle modification. Pt does try to avoid large item carbs--breads, rice, pastas. He denies paresthesias or dysesthesias in feet. He denies lburry vision. Patient states he does work out at "[employer] 3 to 4 days a week. Blood Sugars: AM-- . After Dinner-- 110. Last Eye Exam: 12/2021. U.Microalbumin/Cr: 8/19/2021  Patient is not currently on ACE inhibitor or ARBno hypertension or proteinuria  Insulin Treated? No.    ASA: Yes.     Tobacco: No   Last foot exam: 7/30/2020        Past Medical History:   Diagnosis Date    HNP (herniated nucleus pulposus), cervical 2009    Dr. Nathalie Xiong    Impaired fasting glucose     Psoriasis     Dr. Berlin Gipson    Type 2 diabetes mellitus without complication, without long-term current use of insulin ([facility] [address] 5/10/2018         MEDICATIONS:  Prior to Visit Medications    Medication Sig Taking? Authorizing Provider   aspirin EC 81 MG EC tablet Take 1 tablet by mouth daily. Yes Benedict Fonseca, DO           Review of Systems - As per HPI  GEN: Pt denies fever, chills or night sweats. Denies weight changes. Appetite good  HEENT: Pt denies visual and auditory changes, epistaxis, upper respiratory symptoms and dysphagia  CV: Pt denies CP, SOB, SOLIZ, orthopnea palpitations, LE swelling, and claudication. PULM: Pt denies cough, wheezing or sputum production  GI: Pt denies N/V/D, heart burn, rectal bleeding, or melena. NEURO: Pt denies unilateral weakness, paresthesia and dizziness. OBJECTIVE:  Vitals:    02/17/22 0804 02/17/22 0810 02/17/22 0829   BP: (!) 194/46 (!) 142/86 132/70   Site: Right Upper Arm Left Upper Arm    Position: Sitting Sitting    Pulse: 93     Resp: 14     SpO2: 95%     Weight: (!) 315 lb 9.6 oz (143.2 kg)     Height: 6' 1\" (1.854 m)       Body mass index is 41.64 kg/m². Wt Readings from Last 3 Encounters:   02/17/22 (!) 315 lb 9.6 oz (143.2 kg)   08/19/21 (!) 305 lb (138.3 kg)   07/30/20 (!) 306 lb (138.8 kg)     BP Readings from Last 3 Encounters:   02/17/22 132/70   08/19/21 130/78   07/30/20 127/87      GEN: NAD, A&O, Non-toxic  HEENT: NC/AT, JOHN, EOMI, Oral cavity Clear,  TM's NL, Nasal cavity clear. NECK: Supple. No thyromegaly. No JVD  LYMPH: No C/SC nodes. CV: S1 S2 NL, RRR. No murmurs, clicks or rubs. PULM: CTA, symmentric air exchange  EXT: No edema  GI: Abdomen is soft, obese  NEURO: No focal or lateralizing deficits. Monofilament testing intact both feet. Vibratory sensation in both feet. VASC:  No carotid bruits. Pulses symmetric  SKIN:  No rashes or lesions of concern      ASSESSMENT/PLAN:    1.  Type 2 diabetes mellitus without complication, without long-term current use of insulin ([facility] [address]  Condition stability and control are uncertain  Due for lab  [facility]  -  DIABETES FOOT EXAM  - Comprehensive Metabolic Panel; Future  - Lipid Panel; Future  - Hemoglobin A1C; Future    2. Elevated BP without diagnosis of hypertension  Blood pressures were initially accelerated  His blood pressure did come down to an acceptable level  Strongly encouraged patient to monitor blood pressure at home. He may be a candidate for an ACE inhibitor or ARB    3. Vitamin D deficiency    - Vitamin D 25 Hydroxy; Future      On this date 2/17/2022 I have spent 36 minutes reviewing previous notes, test results and face to face with the patient discussing the diagnosis and importance of compliance with the treatment plan as well as documenting on the day of the visit. Discussed medications with patient who voiced understanding of their use, indication and potential side effects. Pt also understands the above recommendations. All questions answered. This note was generated completely or in part utilizing Dragon dictation speech recognition software. Occasionally, words are mistranscribed and despite editing, the text may contain inaccuracies due to incorrect word recognition.   If further clarification is needed please contact the office at [phone]       Electronically signed    Julio Graf D.O.

## 2022-05-20 ENCOUNTER — OFFICE VISIT (OUTPATIENT)
Dept: INTERNAL MEDICINE CLINIC | Age: 57
End: 2022-05-20
Payer: COMMERCIAL

## 2022-05-20 VITALS
RESPIRATION RATE: 14 BRPM | SYSTOLIC BLOOD PRESSURE: 122 MMHG | HEART RATE: 75 BPM | HEIGHT: 73 IN | OXYGEN SATURATION: 94 % | WEIGHT: 310.04 LBS | BODY MASS INDEX: 41.09 KG/M2 | DIASTOLIC BLOOD PRESSURE: 84 MMHG

## 2022-05-20 DIAGNOSIS — S20.219A CONTUSION OF RIB, UNSPECIFIED LATERALITY, INITIAL ENCOUNTER: ICD-10-CM

## 2022-05-20 DIAGNOSIS — R10.9 RIGHT FLANK PAIN: Primary | ICD-10-CM

## 2022-05-20 DIAGNOSIS — S39.012A BACK STRAIN, INITIAL ENCOUNTER: ICD-10-CM

## 2022-05-20 LAB
BACTERIA: NORMAL /HPF
BILIRUBIN URINE: NEGATIVE
BILIRUBIN, POC: NEGATIVE
BLOOD URINE, POC: NORMAL
BLOOD, URINE: NEGATIVE
CLARITY, POC: NORMAL
CLARITY: CLEAR
COLOR, POC: YELLOW
COLOR: YELLOW
EPITHELIAL CELLS, UA: 1 /HPF (ref 0–5)
GLUCOSE URINE, POC: NEGATIVE
GLUCOSE URINE: NEGATIVE MG/DL
HYALINE CASTS: 0 /LPF (ref 0–8)
KETONES, POC: NEGATIVE
KETONES, URINE: NEGATIVE MG/DL
LEUKOCYTE EST, POC: NEGATIVE
LEUKOCYTE ESTERASE, URINE: NEGATIVE
MICROSCOPIC EXAMINATION: NORMAL
NITRITE, POC: NEGATIVE
NITRITE, URINE: NEGATIVE
PH UA: 5 (ref 5–8)
PH, POC: 7
PROTEIN UA: NEGATIVE MG/DL
PROTEIN, POC: NORMAL
RBC UA: 0 /HPF (ref 0–4)
SPECIFIC GRAVITY UA: 1.02 (ref 1–1.03)
SPECIFIC GRAVITY, POC: 1.02
URINE TYPE: NORMAL
UROBILINOGEN, POC: 0.2
UROBILINOGEN, URINE: 0.2 E.U./DL
WBC UA: 0 /HPF (ref 0–5)

## 2022-05-20 PROCEDURE — 99213 OFFICE O/P EST LOW 20 MIN: CPT | Performed by: INTERNAL MEDICINE

## 2022-05-20 PROCEDURE — 81002 URINALYSIS NONAUTO W/O SCOPE: CPT | Performed by: INTERNAL MEDICINE

## 2022-05-20 RX ORDER — NAPROXEN 500 MG/1
500 TABLET ORAL 2 TIMES DAILY WITH MEALS
Qty: 30 TABLET | Refills: 0 | Status: SHIPPED | OUTPATIENT
Start: 2022-05-20 | End: 2022-08-23 | Stop reason: ALTCHOICE

## 2022-05-20 RX ORDER — CYCLOBENZAPRINE HCL 5 MG
5 TABLET ORAL NIGHTLY
Qty: 10 TABLET | Refills: 0 | Status: SHIPPED | OUTPATIENT
Start: 2022-05-20 | End: 2022-05-30

## 2022-05-20 NOTE — PROGRESS NOTES
[facility]) Physicians  Internal Medicine  Patient Encounter  Faye Jacome D.O., Arely Ennis        Chief Complaint   Patient presents with    Back Pain     since monday, right lower middle area       HPI: 61 y.o. male seen urgently today with complaint of ongoing right-sidedback pain. Patient states he was doing yard work last weekend. He states he went to work out last Sunday and then planted some jacome. He was kneeling on a pad and he was leaning over a step stool. Pt states he started to have pain located over the right flank. Pain started on Monday 5 days ago. Pain started to radiate into the right abd 2 days later. No pain in scrotum or testicles. He denies nausea or vomiting. Pain is worse with leaning back against the back of the chair. When he sits up, he'll feel the pain in the right abd. He denies burning, tingling, itching. He denies rash. No low back pain. No radicular pain. He denies any hematuria. He denies any nausea or vomiting. Standing from a seated position does not elicit discomfort. Pain is better with sitting still and leaning forward. Patient is grabbing his right side. Past Medical History:   Diagnosis Date    HNP (herniated nucleus pulposus), cervical 2009    Dr. Kaykay Lion Impaired fasting glucose     Psoriasis     Dr. Georgia Patel    Type 2 diabetes mellitus without complication, without long-term current use of insulin ([facility] 5/10/2018         MEDICATIONS:  Medication Sig   aspirin EC 81 MG EC tablet Take 1 tablet by mouth daily. Review of Systems - As per HPI  GEN: Pt denies fever, chills or night sweats. Denies weight changes. Appetite good  HEENT: Pt denies visual and auditory changes, epistaxis, upper respiratory symptoms and dysphagia  CV: Pt denies CP, SOB, SOLIZ, orthopnea palpitations, LE swelling, and claudication. PULM: Pt denies cough, wheezing or sputum production  GI: Pt denies N/V/D, heart burn, rectal bleeding, or melena. NEURO: Pt denies unilateral weakness, paresthesia and dizziness. OBJECTIVE:  /84   Pulse 75   Resp 14   Ht 6' 1\" (1.854 m)   Wt (!) 310 lb 0.6 oz (140.6 kg)   SpO2 94%   BMI 40.90 kg/m²   GEN: NAD, A&O, Non-toxic  MS: Unable to reproduce tenderness with palpation of the right flank region, thoracic and upper lumbar paraspinals. Slight increased pain around T10-T12 on the right with right lateral bending and extension.  + Ecchymosis across the upper portion of the abdomen where the stepstool was pressing. Patient also has tenderness to palpation of the right lower ribs and right anterolateral costal margin. EXT: No edema. GI: Abdomen is soft, NT, BS+, No hepatomegaly. No masses. NEURO: No focal or lateralizing deficits. No allodynia  SKIN: No herpetiform rash    ASSESSMENT[de-identified]  Ned was seen today for back pain. Diagnoses and all orders for this visit:    Right flank pain  --Suspect this is a back strain  --Differential certainly would include renal stone given the radiation to the side and right abdomen. Additionally rib contusion from where the stool was resting on his lower rib cage  --Will treat with NSAID and a muscle relaxant  --Urinalysis with microscopic  --Home stretches  -     POCT Urinalysis no Micro  -     naproxen (NAPROSYN) 500 MG tablet; Take 1 tablet by mouth 2 times daily (with meals) for 15 days  -     cyclobenzaprine (FLEXERIL) 5 MG tablet; Take 1 tablet by mouth at bedtime for 10 days  -     Urinalysis with Microscopic    Back strain, initial encounter  --As above  -     naproxen (NAPROSYN) 500 MG tablet; Take 1 tablet by mouth 2 times daily (with meals) for 15 days  -     cyclobenzaprine (FLEXERIL) 5 MG tablet; Take 1 tablet by mouth at bedtime for 10 days    Rib contusion      Additional Plan:  1. Advised patient to call should symptoms persist, worsen or if new symptoms develop  2. We will send urine for microscopic analysis  3.   Home back stretches    Discussed medications with patient who voiced understanding of their use, indication and potential side effects. Pt also understands the above recommendations. All questions answered. This note was generated completely or in part utilizing Dragon dictation speech recognition software. Occasionally, words are mistranscribed and despite editing, the text may contain inaccuracies due to incorrect word recognition.   If further clarification is needed please contact the office at [phone]       Electronically signed    Alfred Jimenez D.O.

## 2022-05-20 NOTE — PATIENT INSTRUCTIONS
Patient Education        Back Strain: Care Instructions  Overview     A back strain happens when you overstretch, or pull, a muscle in your back. You may hurt your back in an accident or when you exercise or lift something. Sometimes you may not know how you hurt your back. Most back pain will get better with rest and time. You can take care ofyourself at home to help your back heal.  Follow-up care is a key part of your treatment and safety. Be sure to make and go to all appointments, and call your doctor if you are having problems. It's also a good idea to know your test results and keep alist of the medicines you take. How can you care for yourself at home?  Try to stay as active as you can, but stop or reduce any activity that causes pain.  Put ice or a cold pack on the sore muscle for 10 to 20 minutes at a time to stop swelling. Try this every 1 to 2 hours for 3 days (when you are awake) or until the swelling goes down. Put a thin cloth between the ice pack and your skin.  After 2 or 3 days, apply a heating pad on low or a warm cloth to your back. Some doctors suggest that you go back and forth between hot and cold treatments.  Take pain medicines exactly as directed. ? If the doctor gave you a prescription medicine for pain, take it as prescribed. ? If you are not taking a prescription pain medicine, ask your doctor if you can take an over-the-counter medicine.  Try sleeping on your side with a pillow between your legs. Or put a pillow under your knees when you lie on your back. These measures can ease pain in your lower back.  Return to your usual level of activity slowly. When should you call for help? Call 911 anytime you think you may need emergency care. For example, call if:     You are unable to move a leg at all. Call your doctor now or seek immediate medical care if:     You have new or worse symptoms in your legs, belly, or buttocks.  Symptoms may include:  ? Numbness or tingling. ? Weakness. ? Pain.      You lose bladder or bowel control. Watch closely for changes in your health, and be sure to contact your doctor if:     You have a fever, lose weight, or don't feel well.      You are not getting better as expected. Where can you learn more? Go to https://chpepiceweb.Vestiage. org and sign in to your YouGotListings account. Enter U477 in the Brainsway box to learn more about \"Back Strain: Care Instructions. \"     If you do not have an account, please click on the \"Sign Up Now\" link. Current as of: July 1, 2021               Content Version: 13.2  © 2006-2022 [employer]. Care instructions adapted under license by [facility] ([facility]). If you have questions about a medical condition or this instruction, always ask your healthcare professional. [place] [address] any warranty or liability for your use of this information. Patient Education        Flank Pain: Care Instructions  Your Care Instructions  Flank pain is pain on the side of the back just below the rib cage and above the waist. It can be on one or both sides. Flank pain has many possible causes,including a kidney stone, a urinary tract infection, or back strain. Flank pain may get better on its own. But don't ignore new symptoms, such as fever, nausea and vomiting, urination problems, pain that gets worse, and dizziness. These may be signs of a more serious problem. You may have to havetests or other treatment. Follow-up care is a key part of your treatment and safety. Be sure to make and go to all appointments, and call your doctor if you are having problems. It's also a good idea to know your test results and keep alist of the medicines you take. How can you care for yourself at home?  Rest until you feel better.  Take pain medicines exactly as directed. ? If the doctor gave you a prescription medicine for pain, take it as prescribed.   ? If you are not taking a prescription pain medicine, ask your doctor if you can take an over-the-counter pain medicine, such as acetaminophen (Tylenol), ibuprofen (Advil, Motrin), or naproxen (Aleve). Read and follow all instructions on the label.  If your doctor prescribed antibiotics, take them as directed. Do not stop taking them just because you feel better. You need to take the full course of antibiotics.  To apply heat, put a warm water bottle, a heating pad set on low, or a warm cloth on the painful area. Do not go to sleep with a heating pad on your skin.  To prevent dehydration, drink plenty of fluids. Choose water and other clear liquids until you feel better. If you have kidney, heart, or liver disease and have to limit fluids, talk with your doctor before you increase the amount of fluids you drink. When should you call for help? Call your doctor now or seek immediate medical care if:     Your flank pain gets worse.      You have new symptoms, such as fever, nausea, or vomiting.      You have symptoms of a urinary problem. For example:  ? You have blood or pus in your urine. ? You have chills or body aches. ? It hurts to urinate. ? You have groin or belly pain. Watch closely for changes in your health, and be sure to contact your doctor ifyou do not get better as expected. Where can you learn more? Go to https://idiolesaeb.Gravity Powerplants. org and sign in to your Renkoo account. Enter S191 in the [facility] box to learn more about \"Flank Pain: Care Instructions. \"     If you do not have an account, please click on the \"Sign Up Now\" link. Current as of: July 1, 2021               Content Version: 13.2  © 2006-2022 Healthwise, [employer]. Care instructions adapted under license by [facility] [employer] [facility] ([facility]).  If you have questions about a medical condition or this instruction, always ask your healthcare professional. [address] any warranty or liability for your use of this information. Patient Education        Healthy Upper Back: Exercises  Introduction  Here are some examples of exercises for your upper back. Start each exerciseslowly. Ease off the exercise if you start to have pain. Your doctor or physical therapist will tell you when you can start theseexercises and which ones will work best for you. How to do the exercises  Lower neck and upper back stretch    1. Stretch your arms out in front of your body. Clasp one hand on top of your other hand. 2. Gently reach out so that you feel your shoulder blades stretching away from each other. 3. Gently bend your head forward. 4. Hold for 15 to 30 seconds. 5. Repeat 2 to 4 times. Midback stretch    If you have knee pain, do not do this exercise. 1. Kneel on the floor, and sit back on your ankles. 2. Lean forward, place your hands on the floor, and stretch your arms out in front of you. Rest your head between your arms. 3. Gently push your chest toward the floor, reaching as far in front of you as possible. 4. Hold for 15 to 30 seconds. 5. Repeat 2 to 4 times. Shoulder rolls    1. Sit comfortably with your feet shoulder-width apart. You can also do this exercise while standing. 2. Roll your shoulders up, then back, and then down in a smooth, circular motion. 3. Repeat 2 to 4 times. Wall push-up    1. Stand against a wall with your feet about 12 to 24 inches back from the wall. If you feel any pain when you do this exercise, stand closer to the wall. 2. Place your hands on the wall slightly wider apart than your shoulders, and lean forward. 3. Gently lean your body toward the wall. Then push back to your starting position. Keep the motion smooth and controlled. 4. Repeat 8 to 12 times. Resisted shoulder blade squeeze    For this exercise, you will need elastic exercise material, such as surgicaltubing or Thera-Band. 1. Sit or stand, holding the band in both hands in front of you.  Keep your elbows close to your sides, bent at a 90-degree angle. Your palms should face up. 2. Squeeze your shoulder blades together, and move your arms to the outside, stretching the band. Be sure to keep your elbows at your sides while you do this. 3. Relax. 4. Repeat 8 to 12 times. Resisted rows    For this exercise, you will need elastic exercise material, such as surgicaltubing or Thera-Band. 1. Put the band around a solid object, such as a bedpost, at about waist level. Hold one end of the band in each hand. 2. With your elbows at your sides and bent to 90 degrees, pull the band back to move your shoulder blades toward each other. Return to the starting position. 3. Repeat 8 to 12 times. Follow-up care is a key part of your treatment and safety. Be sure to make and go to all appointments, and call your doctor if you are having problems. It's also a good idea to know your test results and keep alist of the medicines you take. Where can you learn more? Go to https://Finicity.GoIP Global. org and sign in to your CellEra account. Enter V059 in the [employer] box to learn more about \"Healthy Upper Back: Exercises. \"     If you do not have an account, please click on the \"Sign Up Now\" link. Current as of: July 1, 2021               Content Version: 13.2  © 2006-2022 Healthwise, Incorporated. Care instructions adapted under license by [facility] ([facility]). If you have questions about a medical condition or this instruction, always ask your healthcare professional. [place] [address] any warranty or liability for your use of this information. Patient Education        Low Back Pain: Exercises  Introduction  Here are some examples of exercises for you to try. The exercises may be suggested for a condition or for rehabilitation. Start each exercise slowly. Ease off the exercises if you start to have pain. You will be told when to start these exercises and which ones will work bestfor you.   How to do the head), with your elbows spread apart. 3. Slowly tighten your belly muscles and raise your shoulder blades off the floor. 4. Keep your head in line with your body, and do not press your chin to your chest.  5. Hold this position for 1 or 2 seconds, then slowly lower yourself back down to the floor. 6. Repeat 8 to 12 times. Pelvic tilt exercise    1. Lie on your back with your knees bent. 2. \"Brace\" your stomach. This means to tighten your muscles by pulling in and imagining your belly button moving toward your spine. You should feel like your back is pressing to the floor and your hips and pelvis are rocking back. 3. Hold for about 6 seconds while you breathe smoothly. 4. Repeat 8 to 12 times. Heel dig bridging    1. Lie on your back with both knees bent and your ankles bent so that only your heels are digging into the floor. Your knees should be bent about 90 degrees. 2. Then push your heels into the floor, squeeze your buttocks, and lift your hips off the floor until your shoulders, hips, and knees are all in a straight line. 3. Hold for about 6 seconds as you continue to breathe normally, and then slowly lower your hips back down to the floor and rest for up to 10 seconds. 4. Do 8 to 12 repetitions. Hamstring stretch in doorway    1. Lie on your back in a doorway, with one leg through the open door. 2. Slide your leg up the wall to straighten your knee. You should feel a gentle stretch down the back of your leg. 3. Hold the stretch for at least 15 to 30 seconds. Do not arch your back, point your toes, or bend either knee. Keep one heel touching the floor and the other heel touching the wall. 4. Repeat with your other leg. 5. Do 2 to 4 times for each leg. Hip flexor stretch    1. Kneel on the floor with one knee bent and one leg behind you. Place your forward knee over your foot. Keep your other knee touching the floor.   2. Slowly push your hips forward until you feel a stretch in the upper thigh

## 2022-05-21 ENCOUNTER — APPOINTMENT (OUTPATIENT)
Dept: CT IMAGING | Age: 57
End: 2022-05-21
Payer: COMMERCIAL

## 2022-05-21 ENCOUNTER — TELEPHONE (OUTPATIENT)
Dept: INTERNAL MEDICINE CLINIC | Age: 57
End: 2022-05-21

## 2022-05-21 ENCOUNTER — APPOINTMENT (OUTPATIENT)
Dept: GENERAL RADIOLOGY | Age: 57
End: 2022-05-21
Payer: COMMERCIAL

## 2022-05-21 ENCOUNTER — HOSPITAL ENCOUNTER (EMERGENCY)
Age: 57
Discharge: HOME | End: 2022-05-21
Attending: STUDENT IN AN ORGANIZED HEALTH CARE EDUCATION/TRAINING PROGRAM
Payer: COMMERCIAL

## 2022-05-21 VITALS
HEIGHT: 72 IN | OXYGEN SATURATION: 98 % | TEMPERATURE: 98.4 F | RESPIRATION RATE: 20 BRPM | SYSTOLIC BLOOD PRESSURE: 134 MMHG | DIASTOLIC BLOOD PRESSURE: 105 MMHG | HEART RATE: 75 BPM | WEIGHT: 310 LBS | BODY MASS INDEX: 41.99 KG/M2

## 2022-05-21 DIAGNOSIS — R31.29 MICROSCOPIC HEMATURIA: ICD-10-CM

## 2022-05-21 DIAGNOSIS — R10.11 RUQ PAIN: Primary | ICD-10-CM

## 2022-05-21 DIAGNOSIS — R10.9 RIGHT FLANK PAIN: ICD-10-CM

## 2022-05-21 DIAGNOSIS — M79.18 PAIN IN ABDOMINAL MUSCLE OF RIGHT FLANK: Primary | ICD-10-CM

## 2022-05-21 DIAGNOSIS — S20.211D CONTUSION OF RIB ON RIGHT SIDE, SUBSEQUENT ENCOUNTER: ICD-10-CM

## 2022-05-21 DIAGNOSIS — T14.8XXA MUSCLE STRAIN: ICD-10-CM

## 2022-05-21 LAB
ALBUMIN SERPL-MCNC: 4.4 G/DL (ref 3.4–5)
ALP BLD-CCNC: 54 U/L (ref 40–129)
ALT SERPL-CCNC: 23 U/L (ref 10–40)
ANION GAP SERPL CALCULATED.3IONS-SCNC: 8 MMOL/L (ref 3–16)
AST SERPL-CCNC: 62 U/L (ref 15–37)
BASOPHILS ABSOLUTE: 0 K/UL (ref 0–0.2)
BASOPHILS RELATIVE PERCENT: 0.8 %
BILIRUB SERPL-MCNC: 0.5 MG/DL (ref 0–1)
BILIRUBIN DIRECT: <0.2 MG/DL (ref 0–0.3)
BILIRUBIN URINE: NEGATIVE
BILIRUBIN, INDIRECT: ABNORMAL MG/DL (ref 0–1)
BLOOD, URINE: ABNORMAL
BUN BLDV-MCNC: 12 MG/DL (ref 7–20)
CALCIUM SERPL-MCNC: 9.8 MG/DL (ref 8.3–10.6)
CHLORIDE BLD-SCNC: 101 MMOL/L (ref 99–110)
CLARITY: CLEAR
CO2: 25 MMOL/L (ref 21–32)
COLOR: YELLOW
CREAT SERPL-MCNC: 1.2 MG/DL (ref 0.9–1.3)
EOSINOPHILS ABSOLUTE: 0.1 K/UL (ref 0–0.6)
EOSINOPHILS RELATIVE PERCENT: 1.1 %
EPITHELIAL CELLS, UA: ABNORMAL /HPF (ref 0–5)
GFR AFRICAN AMERICAN: >60
GFR NON-AFRICAN AMERICAN: >60
GLUCOSE BLD-MCNC: 107 MG/DL (ref 70–99)
GLUCOSE URINE: NEGATIVE MG/DL
HCT VFR BLD CALC: 46.7 % (ref 40.5–52.5)
HEMOGLOBIN: 15.3 G/DL (ref 13.5–17.5)
KETONES, URINE: NEGATIVE MG/DL
LEUKOCYTE ESTERASE, URINE: NEGATIVE
LIPASE: 21 U/L (ref 13–60)
LYMPHOCYTES ABSOLUTE: 1.3 K/UL (ref 1–5.1)
LYMPHOCYTES RELATIVE PERCENT: 29.6 %
MCH RBC QN AUTO: 26.1 PG (ref 26–34)
MCHC RBC AUTO-ENTMCNC: 32.8 G/DL (ref 31–36)
MCV RBC AUTO: 79.6 FL (ref 80–100)
MICROSCOPIC EXAMINATION: YES
MONOCYTES ABSOLUTE: 0.4 K/UL (ref 0–1.3)
MONOCYTES RELATIVE PERCENT: 9.3 %
NEUTROPHILS ABSOLUTE: 2.7 K/UL (ref 1.7–7.7)
NEUTROPHILS RELATIVE PERCENT: 59.2 %
NITRITE, URINE: NEGATIVE
PDW BLD-RTO: 15.1 % (ref 12.4–15.4)
PH UA: 6 (ref 5–8)
PLATELET # BLD: 358 K/UL (ref 135–450)
PMV BLD AUTO: 7.8 FL (ref 5–10.5)
POTASSIUM SERPL-SCNC: 4.3 MMOL/L (ref 3.5–5.1)
POTASSIUM SERPL-SCNC: 7.8 MMOL/L (ref 3.5–5.1)
PROTEIN UA: NEGATIVE MG/DL
RBC # BLD: 5.86 M/UL (ref 4.2–5.9)
RBC UA: ABNORMAL /HPF (ref 0–4)
SODIUM BLD-SCNC: 134 MMOL/L (ref 136–145)
SPECIFIC GRAVITY UA: 1.02 (ref 1–1.03)
TOTAL PROTEIN: 8.3 G/DL (ref 6.4–8.2)
URINE TYPE: ABNORMAL
UROBILINOGEN, URINE: 0.2 E.U./DL
WBC # BLD: 4.6 K/UL (ref 4–11)
WBC UA: ABNORMAL /HPF (ref 0–5)

## 2022-05-21 PROCEDURE — 85025 COMPLETE CBC W/AUTO DIFF WBC: CPT

## 2022-05-21 PROCEDURE — 71046 X-RAY EXAM CHEST 2 VIEWS: CPT

## 2022-05-21 PROCEDURE — 80048 BASIC METABOLIC PNL TOTAL CA: CPT

## 2022-05-21 PROCEDURE — 80076 HEPATIC FUNCTION PANEL: CPT

## 2022-05-21 PROCEDURE — 74177 CT ABD & PELVIS W/CONTRAST: CPT

## 2022-05-21 PROCEDURE — 84132 ASSAY OF SERUM POTASSIUM: CPT

## 2022-05-21 PROCEDURE — 81001 URINALYSIS AUTO W/SCOPE: CPT

## 2022-05-21 PROCEDURE — 83690 ASSAY OF LIPASE: CPT

## 2022-05-21 PROCEDURE — 6360000004 HC RX CONTRAST MEDICATION: Performed by: STUDENT IN AN ORGANIZED HEALTH CARE EDUCATION/TRAINING PROGRAM

## 2022-05-21 PROCEDURE — 36415 COLL VENOUS BLD VENIPUNCTURE: CPT

## 2022-05-21 PROCEDURE — 99285 EMERGENCY DEPT VISIT HI MDM: CPT

## 2022-05-21 RX ORDER — LIDOCAINE 50 MG/G
1 PATCH TOPICAL DAILY
Qty: 30 PATCH | Refills: 0 | Status: SHIPPED | OUTPATIENT
Start: 2022-05-21 | End: 2022-08-23 | Stop reason: ALTCHOICE

## 2022-05-21 RX ORDER — LIDOCAINE 4 G/G
1 PATCH TOPICAL ONCE
Status: DISCONTINUED | OUTPATIENT
Start: 2022-05-21 | End: 2022-05-21 | Stop reason: HOSPADM

## 2022-05-21 RX ADMIN — IOPAMIDOL 70 ML: 755 INJECTION, SOLUTION INTRAVENOUS at 17:24

## 2022-05-21 ASSESSMENT — PAIN DESCRIPTION - ORIENTATION: ORIENTATION: RIGHT;MID

## 2022-05-21 ASSESSMENT — PAIN DESCRIPTION - LOCATION: LOCATION: BACK

## 2022-05-21 ASSESSMENT — PAIN SCALES - GENERAL: PAINLEVEL_OUTOF10: 8

## 2022-05-21 ASSESSMENT — PAIN DESCRIPTION - DESCRIPTORS: DESCRIPTORS: SHARP;STABBING

## 2022-05-21 ASSESSMENT — PAIN - FUNCTIONAL ASSESSMENT: PAIN_FUNCTIONAL_ASSESSMENT: 0-10

## 2022-05-21 NOTE — TELEPHONE ENCOUNTER
Patient called this morning reporting increasing pain involving the right flank area and now right upper quadrant of the abdomen. He was seen yesterday with right flank pain, thoracic pain, right costal pain all thought to be due to strain and contusion. The patient had been doing some yard work and was kneeling with his chest over a step stool. There was some bruising. He is now complaining of worsening right flank and scapular pain with worsening right upper quadrant pain. No nausea or vomiting. I am not able to schedule a stat CT on an outpatient basis on Saturday. I have advised that he go to the emergency department. I will notify the ER of his impending arrival.  Patient expresses understanding and agreed.

## 2022-05-21 NOTE — ED NOTES
Patient complains of back and flank pain since morning. PIV placed using aseptic technique per hospital policy. Blood collected and sent to lab.      Adrian Kirby RN  05/21/22 1438

## 2022-05-21 NOTE — ED NOTES
Patient discharged to home via family. Written discharge instructions reviewed with understanding. Copy of AVS and signed prescription sent home with patient. Patient able to walk from ED without assistance.        Alecia Mccabe RN  05/21/22 1766

## 2022-05-21 NOTE — ED PROVIDER NOTES
[address] ENCOUNTER          ATTENDING PHYSICIAN NOTE       Date of evaluation: 5/21/2022    Chief Complaint     Chief Complaint   Patient presents with    Back Pain     was gardening last week and started with right mid abck pain that has worsened and is not radiating around to abdomen, -n/v, -fever, sent in by PCP for CT scan         History of Present Illness     HPI   Ned Brooks is a 61 y.o. male with a past medical history of DMT2, psoriasis, obesity, who presents to the ED today for evaluation of right flank pain that has been present since doing some gardening work last weekend. The patient was sent to the ED today for further evaluation after seeing his primary care physician yesterday for the same, and which was thought to be a localized contusion and muscle strain due to compression or pressure on that region from leaning into a stepstool for an extended period of time while he was gardening. His PCP called me before this patient's visit to tell me the background information and of what he is concerned about. Patient states that this past weekend he was outside for an extended period of time doing some gardening work for his wife and while doing this was leaning over a step stool with his upper body weight essentially leaning into the stepstool. He said that later that day he felt some discomfort to the area of his right flank, but that the pain has only gotten worse throughout this week, and was apparently much worse today than it was yesterday, leading him to call or his PCP back and his PCP sent him here to the ED. Patient denies any dysuria or hematuria, though the PCP states that they did do a UA yesterday and there was microscopic blood in his urine. Patient denies any respiratory difficulty, he denies any other inciting events that could have caused this from a trauma perspective. He denies any recent illness or infection.   He denies any midline BP (!) 153/108      Pulse 86      Resp 14      Temp 98.4 °F (36.9 °C)      Temp Source Oral      SpO2 98 %      Weight (!) 310 lb (140.6 kg)      Height 6' (1.829 m)       Physical Exam  Vitals and nursing note reviewed. Constitutional:       Appearance: Normal appearance. He is obese. He is not ill-appearing. HENT:      Head: Normocephalic and atraumatic. Nose: Nose normal.      Mouth/Throat:      Mouth: Mucous membranes are moist.   Eyes:      Extraocular Movements: Extraocular movements intact. Cardiovascular:      Rate and Rhythm: Normal rate and regular rhythm. Pulmonary:      Effort: Pulmonary effort is normal.      Breath sounds: Normal breath sounds. Chest:      Chest wall: Tenderness present. Comments: His pain is located along the lateral aspect of his right lower chest wall along the area of the mid axillary line at the lower costophrenic angle. He has negative Aaron sign. His pain does not cross into the front of the true right upper quadrant or into the epigastric region. He also has no midline cervical, thoracic or lumbar spinal tenderness on exam.  Abdominal:      General: Abdomen is flat. Bowel sounds are normal. There is no distension. Palpations: Abdomen is soft. Musculoskeletal:         General: Swelling and tenderness present. Cervical back: Neck supple. Skin:     General: Skin is warm and dry. Neurological:      Mental Status: He is alert and oriented to person, place, and time. Diagnostic Results     RADIOLOGY:  CT ABDOMEN PELVIS W IV CONTRAST Additional Contrast? None   Final Result      1. No acute intra-abdominopelvic abnormality. XR CHEST (2 VW)   Final Result   Impression:   1. No acute cardiopulmonary disease.           LABS:   Results for orders placed or performed during the hospital encounter of 05/21/22   CBC with Auto Differential   Result Value Ref Range    WBC 4.6 4.0 - 11.0 K/uL    RBC 5.86 4.20 - 5.90 M/uL    Hemoglobin 15.3 13.5 - 17.5 g/dL    Hematocrit 46.7 40.5 - 52.5 %    MCV 79.6 (L) 80.0 - 100.0 fL    MCH 26.1 26.0 - 34.0 pg    MCHC 32.8 31.0 - 36.0 g/dL    RDW 15.1 12.4 - 15.4 %    Platelets 642 478 - 450 K/uL    MPV 7.8 5.0 - 10.5 fL    Neutrophils % 59.2 %    Lymphocytes % 29.6 %    Monocytes % 9.3 %    Eosinophils % 1.1 %    Basophils % 0.8 %    Neutrophils Absolute 2.7 1.7 - 7.7 K/uL    Lymphocytes Absolute 1.3 1.0 - 5.1 K/uL    Monocytes Absolute 0.4 0.0 - 1.3 K/uL    Eosinophils Absolute 0.1 0.0 - 0.6 K/uL    Basophils Absolute 0.0 0.0 - 0.2 K/uL   Basic Metabolic Panel   Result Value Ref Range    Sodium 134 (L) 136 - 145 mmol/L    Potassium 7.8 (HH) 3.5 - 5.1 mmol/L    Chloride 101 99 - 110 mmol/L    CO2 25 21 - 32 mmol/L    Anion Gap 8 3 - 16    Glucose 107 (H) 70 - 99 mg/dL    BUN 12 7 - 20 mg/dL    CREATININE 1.2 0.9 - 1.3 mg/dL    GFR Non-African American >60 >60    GFR African American >60 >60    Calcium 9.8 8.3 - 10.6 mg/dL   Hepatic Function Panel   Result Value Ref Range    Total Protein 8.3 (H) 6.4 - 8.2 g/dL    Albumin 4.4 3.4 - 5.0 g/dL    Alkaline Phosphatase 54 40 - 129 U/L    ALT 23 10 - 40 U/L    AST 62 (H) 15 - 37 U/L    Total Bilirubin 0.5 0.0 - 1.0 mg/dL    Bilirubin, Direct <0.2 0.0 - 0.3 mg/dL    Bilirubin, Indirect see below 0.0 - 1.0 mg/dL   Lipase   Result Value Ref Range    Lipase 21.0 13.0 - 60.0 U/L   Urinalysis with Microscopic   Result Value Ref Range    Color, UA Yellow Straw/Yellow    Clarity, UA Clear Clear    Glucose, Ur Negative Negative mg/dL    Bilirubin Urine Negative Negative    Ketones, Urine Negative Negative mg/dL    Specific Gravity, UA 1.025 1.005 - 1.030    Blood, Urine TRACE-INTACT (A) Negative    pH, UA 6.0 5.0 - 8.0    Protein, UA Negative Negative mg/dL    Urobilinogen, Urine 0.2 <2.0 E.U./dL    Nitrite, Urine Negative Negative    Leukocyte Esterase, Urine Negative Negative    Microscopic Examination YES     Urine Type Other     WBC, UA None seen 0 - 5 /HPF    RBC, UA 3-4 0 - 4 /HPF    Epithelial Cells, UA 0-1 0 - 5 /HPF   Potassium   Result Value Ref Range    Potassium 4.3 3.5 - 5.1 mmol/L       RECENT VITALS:  BP: (!) 134/105,Temp: 98.4 °F (36.9 °C), Heart Rate: 75, Resp: 20, SpO2: 98 %     Procedures     ED BEDSIDE ULTRASOUND:  None     Procedures   none    ED Course          Key medications administered in the ED:  ED Medication Orders (From admission, onward)    Start Ordered     Status Ordering Provider    05/21/22 1723 05/21/22 1724  iopamidol (ISOVUE-370) 76 % injection 70 mL  IMG ONCE PRN         Last MAR action: Given - by Yoli Carlos on 05/21/22 at [facility]           CONSULTS:  None    MEDICAL DECISIONMAKING / ASSESSMENT / PLAN   I have reviewed and confirmed nursing documentation for the pertinent past medical history, family history, and social history. Ned Walker is a 61 y.o. male with PMHx as above, who presented to the ED today for the above described symptoms of right flank/right lower chest wall pain and some tenderness on examination following the events described above. On inspection he has no ecchymoses and no evidence of overt inflammation, his anterior and lateral chest walls are symmetric with 1 another. There is a focal point of tenderness along the mid axillary to anterior axillary line at the right lateral lower chest wall along the area of ribs 11 or 12. He has no difficulty urinating, no dysuria. His work-up reveals a normal CBC, metabolic panel with elevated serum potassium of 7.8, however there is significant hemolysis within this, so a repeat potassium was collected and has resulted as normal at 4.3. His hepatic function panel reveals an increased AST at 62, with a normal ALT and alk phos.   His urinalysis collected today in the emergency department reveals trace blood and no other acute abnormalities, he also has a normal lipase and a CT abdomen and pelvis with IV contrast was obtained, which reveals no acute intra-abdominal pathology to explain the patient's symptoms. Differential Dx: Chest wall contusion, intercostal muscle strain, abdominal muscle strain, nephrolithiasis, acute cholecystitis, cholelithiasis    Additional ED Course, Interventions & Dispo:    As described previously, the patient arrived afebrile and hemodynamically stable, appears well, appears age-appropriate, is in no obvious acute distress.  His examination is reassuring from a general perspective and assessment, I have very little concern that this is an acute hepatobiliary abnormality given the location of his pain along the right lateral lower chest wall, this is the same region in which the patient states he was putting a lot of his body pressure onto the stepstool the other day, and I favor that he has an improving focal contusion due to pressure/pressure injury. There is no visible hematoma on exam.  He has no evidence of fractured ribs on his chest x-ray. Also very little concern for cardiac etiology, not consistent with HPI and physical exam.  I have low suspicion for occult nephrotic syndrome, nephrotic syndrome, or occult blunt injury to the right kidney.  I favor most likely etiology of the source of his pain is that of muscle strain and pressure type injury due to extended compression of the focal area described.  He was instructed on the liberal use of anti-inflammatory medications as well as the addition of topical lidocaine patches. He was instructed to follow-up with his primary care physician next week if the pain persists, though if anything gets worse or any new concerning or worsening symptoms do arise, that he should proceed for further evaluation by either his PCP, urgent care or return to the ED as needed. At this time I believe no further emergent labs or imaging are indicated and the patient to be safe for discharge to home.  They are in agreement with this plan, and are comfortable with discharge to home at this time. I discussed with them concerning signs and symptoms that would necessitate return to the emergency department. They voiced understanding of these instructions and had no further questions. Clinical Impression     1. Pain in abdominal muscle of right flank    2. Muscle strain        Disposition     DISPOSITION:   Decision To Discharge 05/21/2022 06:57:16 PM      PATIENT REFERRED TO:  [address] [place] [address]  [phone]    Call   For ED follow up visit. DISCHARGE MEDICATIONS:  Discharge Medication List as of 5/21/2022  6:49 PM      START taking these medications    Details   lidocaine (LIDODERM) 5 % Place 1 patch onto the skin daily 12 hours on, 12 hours off., Disp-30 patch, R-0Normal         CONTINUE these medications which have NOT CHANGED    Details   naproxen (NAPROSYN) 500 MG tablet Take 1 tablet by mouth 2 times daily (with meals) for 15 days, Disp-30 tablet, R-0Normal      cyclobenzaprine (FLEXERIL) 5 MG tablet Take 1 tablet by mouth at bedtime for 10 days, Disp-10 tablet, R-0Normal      aspirin EC 81 MG EC tablet Take 1 tablet by mouth daily. , Disp-30 tablet, R-3              Andrew Whitley MD  [facility]     Machelle Ferrer MD  05/26/22 7977

## 2022-05-21 NOTE — Clinical Note
[facility] [place] was seen and treated in our emergency department on 5/21/2022. He may return to work on 05/26/2022. If you have any questions or concerns, please don't hesitate to call.       Corby Rodriguez MD

## 2022-05-31 ENCOUNTER — TELEPHONE (OUTPATIENT)
Dept: INTERNAL MEDICINE CLINIC | Age: 57
End: 2022-05-31

## 2022-05-31 NOTE — TELEPHONE ENCOUNTER
Unfortunately, he is got a need reevaluated. Its not clear why this has not improved. He will need to be placed in an open spot.

## 2022-05-31 NOTE — TELEPHONE ENCOUNTER
Patient called stating that he was sent to the ER for side and back pain by dr Bam Copeland on 5/21/2022. He says he is now still experiencing the back and side pain. Even after going to the ER.  He would like to know how he should porocele or does he need to be seen by the doctor again

## 2022-06-01 ENCOUNTER — HOSPITAL ENCOUNTER (OUTPATIENT)
Dept: GENERAL RADIOLOGY | Age: 57
Discharge: HOME | End: 2022-06-01
Payer: COMMERCIAL

## 2022-06-01 ENCOUNTER — OFFICE VISIT (OUTPATIENT)
Dept: INTERNAL MEDICINE CLINIC | Age: 57
End: 2022-06-01
Payer: COMMERCIAL

## 2022-06-01 ENCOUNTER — HOSPITAL ENCOUNTER (OUTPATIENT)
Age: 57
Discharge: HOME | End: 2022-06-01
Payer: COMMERCIAL

## 2022-06-01 VITALS
HEART RATE: 84 BPM | HEIGHT: 73 IN | RESPIRATION RATE: 14 BRPM | OXYGEN SATURATION: 95 % | WEIGHT: 304.8 LBS | DIASTOLIC BLOOD PRESSURE: 88 MMHG | SYSTOLIC BLOOD PRESSURE: 132 MMHG | BODY MASS INDEX: 40.39 KG/M2

## 2022-06-01 DIAGNOSIS — R63.4 WEIGHT LOSS, UNINTENTIONAL: ICD-10-CM

## 2022-06-01 DIAGNOSIS — R10.9 RIGHT FLANK PAIN: ICD-10-CM

## 2022-06-01 DIAGNOSIS — R74.01 ELEVATED AST (SGOT): ICD-10-CM

## 2022-06-01 DIAGNOSIS — R10.11 RUQ PAIN: ICD-10-CM

## 2022-06-01 DIAGNOSIS — R19.5 HARD STOOL: ICD-10-CM

## 2022-06-01 DIAGNOSIS — S20.211D CONTUSION OF RIB ON RIGHT SIDE, SUBSEQUENT ENCOUNTER: ICD-10-CM

## 2022-06-01 DIAGNOSIS — R10.11 RUQ PAIN: Primary | ICD-10-CM

## 2022-06-01 DIAGNOSIS — R14.0 ABDOMINAL BLOATING: ICD-10-CM

## 2022-06-01 DIAGNOSIS — S29.019A THORACIC MYOFASCIAL STRAIN, INITIAL ENCOUNTER: ICD-10-CM

## 2022-06-01 PROCEDURE — 99214 OFFICE O/P EST MOD 30 MIN: CPT | Performed by: INTERNAL MEDICINE

## 2022-06-01 PROCEDURE — 71100 X-RAY EXAM RIBS UNI 2 VIEWS: CPT

## 2022-06-01 RX ORDER — POLYETHYLENE GLYCOL 3350 17 G/17G
17 POWDER, FOR SOLUTION ORAL DAILY
Qty: 1530 G | Refills: 0 | Status: SHIPPED | OUTPATIENT
Start: 2022-06-01 | End: 2022-08-23 | Stop reason: ALTCHOICE

## 2022-06-01 RX ORDER — METHOCARBAMOL 500 MG/1
500 TABLET, FILM COATED ORAL 4 TIMES DAILY PRN
Qty: 40 TABLET | Refills: 0 | Status: SHIPPED | OUTPATIENT
Start: 2022-06-01 | End: 2022-06-11

## 2022-06-01 RX ORDER — TRAMADOL HYDROCHLORIDE 50 MG/1
50 TABLET ORAL EVERY 6 HOURS PRN
Qty: 28 TABLET | Refills: 0 | Status: SHIPPED | OUTPATIENT
Start: 2022-06-01 | End: 2022-06-10 | Stop reason: SDUPTHER

## 2022-06-01 ASSESSMENT — PATIENT HEALTH QUESTIONNAIRE - PHQ9
SUM OF ALL RESPONSES TO PHQ QUESTIONS 1-9: 0
SUM OF ALL RESPONSES TO PHQ QUESTIONS 1-9: 0
SUM OF ALL RESPONSES TO PHQ9 QUESTIONS 1 & 2: 0
SUM OF ALL RESPONSES TO PHQ QUESTIONS 1-9: 0
2. FEELING DOWN, DEPRESSED OR HOPELESS: 0
SUM OF ALL RESPONSES TO PHQ QUESTIONS 1-9: 0
1. LITTLE INTEREST OR PLEASURE IN DOING THINGS: 0

## 2022-06-01 NOTE — PROGRESS NOTES
[address] [facility]  Internal Medicine  Patient Encounter  Sruthi Crespo D.O., [facility]        Chief Complaint   Patient presents with    Follow-up       HPI: 61 y.o. male seen today for a short interval follow-up regarding pain on the right side. Patient was initially seen on 5/20/2022 with complaints of right-sided back pain. He located the pain over the right flank area. The pain seemed to radiate to the right side and to the right upper abdomen/lower rib cage. Upon questioning, the patient states he had been doing yard work just a few days before symptoms started. Patient states that he was leaning over a step stool with his chest over the stool. Patient was noted to have some bruising involving the upper abdomen and lower chest wall. He was found to have tenderness to palpation over the right flank, thoracic and upper lumbar paraspinals with slight increased tenderness around T10-T12 with lateral bending and extension. He also had tenderness to palpation of the right lower ribs and right anterolateral costal margin. I suspected that his pain was musculoskeletal in origin however his urinalysis showed trace microscopic hematuria. He was placed on naproxen and a muscle relaxant. He was advised to call with any worsening or new symptoms. His urinalysis was sent for microscopic analysis. The UA that was sent to the lab was negative. On 5/21/2022 the patient called reporting increasing pain over the right flank area in addition to the right upper quadrant of the abdomen. We tried to schedule him for a stat CT but the department was not able to schedule him. He went to the ER. His blood pressure was 153/108. He had chest wall tenderness on examination according to the ER report. He also had tenderness along the lateral aspect of the right lower chest and along the mid axillary line at the lower costophrenic angle. His abdomen examination was benign.    Chest x-ray was unremarkable other than a mild elevation of the left hemidiaphragm. Ribs were not x-rayed. CT scan of the abdomen was obtained. The liver appeared fatty, prostate was slightly enlarged. There was a right renal cyst.  Gallbladder appeared normal.  Lipase was normal.  AST was elevated to 62. ALT was normal.    Yesterday was bad and worse than it had been. Pt states the pain was in the back and in the right upper abdomen. Yesterday, the pain started when he woke. The back was hurting first.  He took Naproxen. He tried the Lidoderm. Pain was worsening. No skin rash. Pain felt in the anterior abd. Pain worse with movement and pressing on the area. He has not had a good appetite. He has lost 6#. He has not been able to eat. When the pain starts, he feels bloated and the abdomen hardens. He denies nausea. He is passing more gas than usual.  No vomiting. Stools are hard. He has had BM after exlax and he felt better. No fever  No chills  No vomiting  No hematochezia  No melena    Today he feels better. Past Medical History:   Diagnosis Date    HNP (herniated nucleus pulposus), cervical 2009    Dr. Eliana Gross    Impaired fasting glucose     Psoriasis     Dr. Dayana Sanz    Type 2 diabetes mellitus without complication, without long-term current use of insulin ([facility] 5/10/2018         MEDICATIONS:  Medication Sig   lidocaine (LIDODERM) 5 % Place 1 patch onto the skin daily 12 hours on, 12 hours off.   naproxen (NAPROSYN) 500 MG tablet Take 1 tablet by mouth 2 times daily (with meals) for 15 days   aspirin EC 81 MG EC tablet Take 1 tablet by mouth daily. Review of Systems - As per HPI  GEN: Pt denies fever, chills or night sweats. Denies weight changes. Appetite good  HEENT: Pt denies visual and auditory changes, epistaxis, upper respiratory symptoms and dysphagia  CV: Pt denies CP, SOB, SOLIZ, orthopnea palpitations, LE swelling, and claudication.   PULM: Pt denies cough, wheezing or sputum production  GI: Pt denies N/V/D, heart burn, rectal bleeding, or melena. NEURO: Pt denies unilateral weakness, paresthesia and dizziness. SKIN: No rash    OBJECTIVE:  Vitals:    06/01/22 1154   BP: 132/88   Pulse: 84   Resp: 14   SpO2: 95%   Weight: (!) 304 lb 12.8 oz (138.3 kg)   Height: 6' 1\" (1.854 m)     Body mass index is 40.21 kg/m². Wt Readings from Last 3 Encounters:   06/01/22 (!) 304 lb 12.8 oz (138.3 kg)   05/21/22 (!) 310 lb (140.6 kg)   05/20/22 (!) 310 lb 0.6 oz (140.6 kg)     BP Readings from Last 3 Encounters:   06/01/22 132/88   05/21/22 (!) 134/105   05/20/22 122/84      GEN: NAD, A&O, Non-toxic  HEENT: NC/AT, JOHN, EOMI, Oral cavity Clear,  TM's NL, Nasal cavity clear. NECK: Supple. No thyromegaly. No JVD  LYMPH: No C/SC nodes. CV: S1 S2 NL, RRR. No murmurs, clicks or rubs. PULM: CTA, symmentric air exchange  EXT: No C/C/E  GI: Abdomen is soft, NT, BS+, No hepatomegaly. No masses. NEURO: No focal or lateralizing deficits. VASC:  No carotid bruits. SKIN:  No rashes or lesions of concern  MS: I am unable to reproduce tenderness with palpation of the thoracic paraspinals. He has mild reproducible tenderness to palpation of the right floating ribs. He has no tenderness to palpation of the right anterior costal margin. Range of motion of the thoracic spine and lumbar fine does not elicit pain. Specifically flexion, lateral bending. ASSESSMENT/PLAN:    1. RUQ pain  Not reproducible on today's exam  Check gallbladder ultrasound  Repeat LFTs  - US GALLBLADDER RUQ; Future    2. Abdominal bloating  Could be related to increased stool burden and constipation  - US GALLBLADDER RUQ; Future    3. Contusion of rib on right side, subsequent encounter  Rule out rib fracture. This was not seen on CT abdomen however we should get a dedicated rib series    4.  Right flank pain  Not reproducible on today's exam  We will give him a different muscle relaxant that should not contribute to constipation  - methocarbamol (ROBAXIN) 500 MG tablet; Take 1 tablet by mouth 4 times daily as needed (muscle spasm or tightness)  Dispense: 40 tablet; Refill: 0  - US GALLBLADDER RUQ; Future    5. Hard stool  Start MiraLAX 17 g daily    6. Weight loss, unintentional  Likely due to decreased caloric intake  - US GALLBLADDER RUQ; Future    7. Thoracic myofascial strain, initial encounter  Suspect recent thoracic myofascial strain doing yard work. Discontinue naproxen  - methocarbamol (ROBAXIN) 500 MG tablet; Take 1 tablet by mouth 4 times daily as needed (muscle spasm or tightness)  Dispense: 40 tablet; Refill: 0      We will call in tramadol for pain. We discussed potential adverse side effects and habit-forming nature of the medication. Discontinue naproxen      Etiology of the patient's symptoms is unclear at this time. Differential diagnosis would include musculoskeletal strain, hepatobiliary, GI (constipation, PUD)    Discussed medications with patient who voiced understanding of their use, indication and potential side effects. Pt also understands the above recommendations. All questions answered. This note was generated completely or in part utilizing Dragon dictation speech recognition software. Occasionally, words are mistranscribed and despite editing, the text may contain inaccuracies due to incorrect word recognition.   If further clarification is needed please contact the office at [phone]       Electronically signed    Danna Yeager D.O.

## 2022-06-01 NOTE — PATIENT INSTRUCTIONS
To do list:      #1 push fluids. Diet should be low to no fat    #2 obtain rib x-ray series    #3 schedule gallbladder ultrasound    #4 tramadol for pain. You may also use Tylenol. #5 stop the naproxen for now    #6 go to the emergency room if your pain escalates.

## 2022-06-03 ENCOUNTER — HOSPITAL ENCOUNTER (OUTPATIENT)
Dept: ULTRASOUND IMAGING | Age: 57
Discharge: HOME | End: 2022-06-03
Payer: COMMERCIAL

## 2022-06-03 DIAGNOSIS — R10.9 RIGHT FLANK PAIN: ICD-10-CM

## 2022-06-03 DIAGNOSIS — R14.0 ABDOMINAL BLOATING: ICD-10-CM

## 2022-06-03 DIAGNOSIS — R63.4 WEIGHT LOSS, UNINTENTIONAL: ICD-10-CM

## 2022-06-03 DIAGNOSIS — R10.11 RUQ PAIN: ICD-10-CM

## 2022-06-03 PROCEDURE — 76705 ECHO EXAM OF ABDOMEN: CPT

## 2022-06-08 ENCOUNTER — OFFICE VISIT (OUTPATIENT)
Dept: INTERNAL MEDICINE CLINIC | Age: 57
End: 2022-06-08
Payer: COMMERCIAL

## 2022-06-08 ENCOUNTER — TELEPHONE (OUTPATIENT)
Dept: INTERNAL MEDICINE CLINIC | Age: 57
End: 2022-06-08

## 2022-06-08 VITALS
BODY MASS INDEX: 40.11 KG/M2 | HEART RATE: 71 BPM | RESPIRATION RATE: 14 BRPM | HEIGHT: 73 IN | DIASTOLIC BLOOD PRESSURE: 78 MMHG | WEIGHT: 302.6 LBS | OXYGEN SATURATION: 90 % | SYSTOLIC BLOOD PRESSURE: 134 MMHG

## 2022-06-08 DIAGNOSIS — M54.9 MID BACK PAIN ON RIGHT SIDE: Primary | ICD-10-CM

## 2022-06-08 DIAGNOSIS — R25.3 FASCICULATIONS: ICD-10-CM

## 2022-06-08 DIAGNOSIS — R10.9 RIGHT SIDED ABDOMINAL PAIN: ICD-10-CM

## 2022-06-08 PROCEDURE — 99215 OFFICE O/P EST HI 40 MIN: CPT | Performed by: INTERNAL MEDICINE

## 2022-06-08 ASSESSMENT — ENCOUNTER SYMPTOMS: COUGH: 0

## 2022-06-08 NOTE — TELEPHONE ENCOUNTER
Stomach feels like sticking with a knife     Cant eat due to pian ( soup for last 3-4 days     Pt went to ED 5/21/22   Saw PCP 6/41/2022    Doesn't want to ED, and will fight pain until the Md returns    r side of back hurts with pressure, pt does not want to use the lidocaine

## 2022-06-08 NOTE — TELEPHONE ENCOUNTER
If he wants to be seen, I will see him today. Does he say why he doesn't want to use the lidocaine? Not sure that I will have anything else to offer, but happy to evaluate.

## 2022-06-08 NOTE — PROGRESS NOTES
Skyler Carroll   :  1965  2022    ASSESSMENT/PLAN:   1. Mid back pain on right side  2. Right sided abdominal pain  3. Fasciculations  -     MRI LUMBAR SPINE WO CONTRAST; Future  -     MRI THORACIC SPINE WO CONTRAST  Acute problem, worsening. Ongoing for a month, with pain now radiating around to front and fasciculations present. Work-up to date includes CT with IV contrast of abdomen and pelvis. Chest x-ray, x-ray of the ribs and right upper quadrant ultrasound. Evaluation has been unremarkable so far. I spoke with radiologist today to review the CT of the abdomen pelvis to see if there is any evidence of disc herniation visible on this, while on scene he was unable to rule out. Based on his current symptoms, the negative evaluation, and his history preceding onset of symptoms, I suspect he could have a disc herniation with radicular symptoms. While it is somewhat unknown usual to not have any midline back pain, the abdominal wall fasciculations are concerning. Has reasonable pain management using tramadol 50 and methocarbamol however the sedation makes it difficult for him to do his job. Advised to use just tramadol during the day and methocarbamol at bedtime. Stat MRI lumbar and thoracic spine. On this date 2022 I have spent 45 minutes reviewing previous notes, test results and face to face with the patient discussing the diagnosis and importance of compliance with the treatment plan as well as documenting on the day of the visit. SUBJECTIVE     61 y.o. male established patient here for:   Chief Complaint   Patient presents with    Follow-up     ongoing back pain     Patient of Dr. Nicola Mari seen acutely today for refractory back pain now radiating to the right right of abdomen. Third approximately a month ago, when he developed right mid back pain started 1 day after planting flowers, bent over for about 6 hours.   Pain has remained fairly focal in the back until very recently when he is began to have more symptoms on the right side of the abdomen as well. He briefly had constipation from using a lidocaine patch but bowels are back to normal.  He has no nausea or vomiting but a markedly decreased appetite due to his pain. He is lost 8 pounds in the last several weeks. Took the tramadol and methocarbamol today and is having some easing of this but is sedating for him. Without pain medication the pain is severe when he is sitting and/or driving. Okay when he is supine but cannot roll onto right side. Better when standing. Has bowel or bladder dysfunction, fevers, chills or sweats. No falls preceding this. Review of Systems   Respiratory: Negative for cough. Cardiovascular: Negative for leg swelling. Outpatient Medications Marked as Taking for the 6/8/22 encounter (Office Visit) with Aisha Austin MD   Medication Sig Dispense Refill    methocarbamol (ROBAXIN) 500 MG tablet Take 1 tablet by mouth 4 times daily as needed (muscle spasm or tightness) 40 tablet 0    polyethylene glycol (GLYCOLAX) 17 GM/SCOOP powder Take 17 g by mouth daily 1530 g 0    traMADol (ULTRAM) 50 MG tablet Take 1 tablet by mouth every 6 hours as needed for Pain for up to 7 days. 28 tablet 0    naproxen (NAPROSYN) 500 MG tablet Take 1 tablet by mouth 2 times daily (with meals) for 15 days 30 tablet 0       OBJECTIVE:  Vitals:    06/08/22 1149   BP: 134/78   Pulse: 71   Resp: 14   SpO2: 90%   Weight: (!) 302 lb 9.6 oz (137.3 kg)   Height: 6' 1\" (1.854 m)     Physical Exam  Constitutional:       Comments: Sits bent forward. Cardiovascular:      Heart sounds: Normal heart sounds. Pulmonary:      Breath sounds: Normal breath sounds. Abdominal:      General: Abdomen is protuberant. Bowel sounds are normal.      Palpations: Abdomen is soft. Tenderness: There is abdominal tenderness (right side at between level of umbilicus and lower margin of rib cage.  Fasiculations noted  in

## 2022-06-09 ENCOUNTER — HOSPITAL ENCOUNTER (OUTPATIENT)
Dept: MRI IMAGING | Age: 57
Discharge: HOME | End: 2022-06-09
Payer: COMMERCIAL

## 2022-06-09 DIAGNOSIS — R25.3 FASCICULATIONS: ICD-10-CM

## 2022-06-09 DIAGNOSIS — M54.9 MID BACK PAIN ON RIGHT SIDE: ICD-10-CM

## 2022-06-09 DIAGNOSIS — R10.9 RIGHT SIDED ABDOMINAL PAIN: ICD-10-CM

## 2022-06-09 PROCEDURE — 72146 MRI CHEST SPINE W/O DYE: CPT

## 2022-06-09 PROCEDURE — 72148 MRI LUMBAR SPINE W/O DYE: CPT

## 2022-06-10 DIAGNOSIS — R10.11 RUQ PAIN: ICD-10-CM

## 2022-06-10 DIAGNOSIS — R25.3 FASCICULATIONS: ICD-10-CM

## 2022-06-10 DIAGNOSIS — S20.211D CONTUSION OF RIB ON RIGHT SIDE, SUBSEQUENT ENCOUNTER: ICD-10-CM

## 2022-06-10 DIAGNOSIS — M54.9 MID BACK PAIN ON RIGHT SIDE: ICD-10-CM

## 2022-06-10 DIAGNOSIS — M51.34 BULGING OF THORACIC INTERVERTEBRAL DISC: Primary | ICD-10-CM

## 2022-06-10 DIAGNOSIS — D17.79 EPIDURAL LIPOMATOSIS: ICD-10-CM

## 2022-06-10 RX ORDER — TRAMADOL HYDROCHLORIDE 50 MG/1
50 TABLET ORAL EVERY 6 HOURS PRN
Qty: 28 TABLET | Refills: 0 | Status: SHIPPED | OUTPATIENT
Start: 2022-06-10 | End: 2022-06-17

## 2022-06-10 NOTE — TELEPHONE ENCOUNTER
Patient would like to have the following medication refilled:        traMADol (ULTRAM) 50 MG tablet [[id number]]               Tashi Contreras [id number] [phone] Soraya Frarar [phone]            Last appointment: 6/1/2022  Next appointment: 8/23/2022  Last refill:

## 2022-06-10 NOTE — TELEPHONE ENCOUNTER
Spoke with patient regarding results of thoracic and lumbar MRI. There are several areas of disc bulge on the thoracic MRI that could correspond with this pain. In addition he has significant epidural lipomatosis throughout the lumbar spine and this is resulting in lumbar spinal stenosis at the L1-2 level. His pain persists however he is managing with the tramadol and methocarbamol. He uses sparingly due to sedation. He is misunderstood and has not been using the Tylenol arthritis. Instructed to start using Tylenol arthritis routinely and then the tramadol as needed. Muscle relaxer at bedtime due to the sedating effect is having him. Referral to Mike. MRIs fax to Mike. Tramadol refilled. RX Monitoring 6/10/2022   Periodic Controlled Substance Monitoring No signs of potential drug abuse or diversion identified.

## 2022-08-23 ENCOUNTER — OFFICE VISIT (OUTPATIENT)
Dept: INTERNAL MEDICINE CLINIC | Age: 57
End: 2022-08-23
Payer: COMMERCIAL

## 2022-08-23 VITALS
HEART RATE: 90 BPM | RESPIRATION RATE: 14 BRPM | DIASTOLIC BLOOD PRESSURE: 86 MMHG | SYSTOLIC BLOOD PRESSURE: 132 MMHG | WEIGHT: 304.4 LBS | OXYGEN SATURATION: 97 % | BODY MASS INDEX: 40.34 KG/M2 | HEIGHT: 73 IN

## 2022-08-23 DIAGNOSIS — E55.9 VITAMIN D DEFICIENCY: ICD-10-CM

## 2022-08-23 DIAGNOSIS — E11.9 TYPE 2 DIABETES MELLITUS WITHOUT COMPLICATION, WITHOUT LONG-TERM CURRENT USE OF INSULIN (HCC): ICD-10-CM

## 2022-08-23 DIAGNOSIS — Z23 NEED FOR PNEUMOCOCCAL VACCINATION: ICD-10-CM

## 2022-08-23 DIAGNOSIS — M51.34 DDD (DEGENERATIVE DISC DISEASE), THORACIC: ICD-10-CM

## 2022-08-23 DIAGNOSIS — Z00.00 ANNUAL PHYSICAL EXAM: Primary | ICD-10-CM

## 2022-08-23 DIAGNOSIS — M54.14 THORACIC RADICULOPATHY: ICD-10-CM

## 2022-08-23 DIAGNOSIS — M48.061 SPINAL STENOSIS OF LUMBAR REGION WITHOUT NEUROGENIC CLAUDICATION: ICD-10-CM

## 2022-08-23 DIAGNOSIS — E78.5 HYPERLIPIDEMIA, UNSPECIFIED HYPERLIPIDEMIA TYPE: ICD-10-CM

## 2022-08-23 DIAGNOSIS — Z12.5 SCREENING FOR PROSTATE CANCER: ICD-10-CM

## 2022-08-23 PROBLEM — M51.369 DDD (DEGENERATIVE DISC DISEASE), LUMBAR: Status: ACTIVE | Noted: 2022-08-23

## 2022-08-23 PROBLEM — M47.816 LUMBAR SPONDYLOSIS: Status: ACTIVE | Noted: 2022-08-23

## 2022-08-23 PROBLEM — M51.36 DDD (DEGENERATIVE DISC DISEASE), LUMBAR: Status: ACTIVE | Noted: 2022-08-23

## 2022-08-23 LAB
CREATININE URINE: 158.1 MG/DL (ref 39–259)
MICROALBUMIN UR-MCNC: <1.2 MG/DL
MICROALBUMIN/CREAT UR-RTO: NORMAL MG/G (ref 0–30)

## 2022-08-23 PROCEDURE — 99396 PREV VISIT EST AGE 40-64: CPT | Performed by: INTERNAL MEDICINE

## 2022-08-23 PROCEDURE — 93000 ELECTROCARDIOGRAM COMPLETE: CPT | Performed by: INTERNAL MEDICINE

## 2022-08-23 PROCEDURE — 90677 PCV20 VACCINE IM: CPT | Performed by: INTERNAL MEDICINE

## 2022-08-23 PROCEDURE — 90471 IMMUNIZATION ADMIN: CPT | Performed by: INTERNAL MEDICINE

## 2022-08-23 NOTE — PROGRESS NOTES
[facility]) Physicians  Internal Medicine  Patient Encounter  Karen Ramey D.O., [facility] Prevent[facility] Physical    Chief Complaint   Patient presents with    Annual Exam       HPI-- 61 y.o. male presents today for a complete annual physical.     He saw Neurosurgery NP and then had TAYLER for back issues. He presented with pain radiated from the right flank to the right upper quadrant. He had MRI of the thoracic and lumbar spine. He was treated for thoracic radiculopathy. He has significant lumbar disease as well. Medical/Surgical Histories     Past Medical History:   Diagnosis Date    DDD (degenerative disc disease), lumbar 06/2022    DDD (degenerative disc disease), thoracic 06/2022    HNP (herniated nucleus pulposus), cervical 2009    Dr. Ghassan Baxter    Impaired fasting glucose     Lumbar foraminal stenosis 06/2022    Lumbar spondylosis 06/2022    Psoriasis     Dr. Sharmin Peterson    Spinal stenosis of lumbar region without neurogenic claudication 06/2022    Thoracic radiculopathy 8/23/2022    Type 2 diabetes mellitus without complication, without long-term current use of insulin ([facility] 05/10/2018    Vitamin D deficiency 8/23/2022          No past surgical history on file. Medications/Allergies     Prior to Visit Medications    Medication Sig Taking?  Authorizing Provider   aspirin EC 81 MG EC tablet Take 81 mg by mouth daily Yes Walker Barron, DO          No Known Allergies      Substance Use History     Social History     Tobacco Use    Smoking status: Never    Smokeless tobacco: Never   Vaping Use    Vaping Use: Never used   Substance Use Topics    Alcohol use: No    Drug use: No      [employer]    Family History     Family History   Problem Relation Age of Onset    High Blood Pressure Mother     Diabetes Mother     Diabetes Father     Heart Disease Father 77        CAD, CABG    High Blood Pressure Father     Kidney Disease Brother         stone    High Blood Pressure Brother Obesity Brother     Heart Disease Brother         CHF    Heart Disease Brother 47        CAD, PTCA with stent, CHF    Diabetes Brother     Hypertension Brother               REVIEW OF SYSTEMS:    CONSTITUTIONAL:  Neg  fatigue, fever, chills or night sweats, anorexia, Sleep difficulties. Weight stable. EYES: Neg  Blurry vision, loss of vision, double vision, tearing, itching, eye pain. + Glasses. EARS:  Neg Hearing loss, tinnitus, vertigo, discharge or otalgia. NOSE:  Neg  itching, epistaxis, or snoring. No nasal congestion, rhinorrhea. MOUTH/THROAT:  Neg Bleeding gums, hoarseness, sore throat, dysphagia, throat infections, or dentures. RESPIRATORY:  Neg SOB ,wheeze, sputum, hemoptysis. No snoring. CARDIOVASCULAR:  Neg Chest pain, palpitations, heart murmur, dyspnea on exertion, orthopnea, paroxysmal nocturnal dyspnea or edema of extremities, or claudication. Coronary Calcium Score 4/28/2019---> Zero  GASTROINTESTINAL:  Neg   Nausea, vomiting, or diarrhea, constipation,  hematemesis, heart burn, dysphagia,change in bowel movements or stool caliber, hematochezia, melena, abdominal pain, or food intolerance. Colonoscopy: 2014, 10 year recall. GENITOURINARY:  Neg  Urinary frequency, hesitancy, urgency, polyuria, dysuria, hematuria, nocturia, incontinence, change in stream, genital pain or swelling, kidney stones, STD's. VARSHA: Yes, PSA: Yes  HEMATOLOGIC/LYMPHATIC:  Neg  Anemia, bleeding dyscrasias, easy bruising, blood clots (DVT/PE), transfusions, or enlarged lymph nodes. MUSCULOSKELETAL:  Neg  New myalgias, joint pain, bone pain, joint swelling, low back pain, neck pain, radicular pain, or fractures. No longer with back pain or radiating pain to RUQ. Seeing Neurosurgery. TAYLER injection helped.     NEUROLOGICAL:  Neg  Loss of Consciousness, memeory loss or forgetfulness, confusion, difficulty concentrating, seizures, insomina, aphasia or dysarthria, unilateral weakness or paresthesias, ataxia, oz (138.3 kg)     BP Readings from Last 3 Encounters:   08/23/22 132/86   06/08/22 134/78   06/01/22 132/88              GEN:  61 y.o. male who is in NAD, A&O. He appears stated age and well nourished. He is cooperative and pleasant. Morbidly obese  HEAD:  NC/AT, no lesions. EYES:  JERMAINE, EOMI, No scleral icterus or conjunctival injection or discharge. Visual fields in tact to confrontation. EARS:  EAC's clear, TM's normal.  NOSE:  Nasal cavity is clear. No mucosal congestion or discharge. Sinuses are nontender. MOUTH & THROAT:  Oral cavity is clear without mucosal lesions. Tongue is midline. Dentition is in good repair. No pharyngeal erythema or exudate. NECK:  Supple. Full ROM. Trachea is midline. No increased JVD. No thyromegaly or nodules. No masses  LYMPH: No C/SC/A/F nodes  CARDIAC: Heart rhythm is regular. No murmurs. No ectopy. Rate is normal.  VASC:  Pedal pulses 2/4. Carotid upstrokes 2+. No bruits noted. PULM:  Lungs are CTA. Symmetric breath sounds noted. AP Diameter NL. No wheezing or forced expiratory wheezing. GI:  Abdomen is soft and nontender. No distension. No organomegaly. No masses. No pulsatile masses. :   External genitalia NL. No testicular or scrotal masses. Penile shaft is NL. No inguinal hernias  BREAST:  No masses. EXT:  No Cyanosis or clubbing. No edema. SKIN: Warm and dry, normal turgor, + Psoriatic plaques BL knees, elbows. NEURO:  Cranial nerves 2-12 are NL. Speech fluent and coherent. Strength is 5/5 in all muscle groups. No sensory deficits. No focal or lateralizing deficits. Reflexes 2/4 and symmetric. Gait is normal.  Monofilament vibratory sensation intact in both feet  MS:  No C/T/L paraspinal tenderness. No scoliosis. No joint effusions. Full joint ROM. PSYCH:  Mood and affect NL. Judgement and insight NL.          ASSESSMENT/PLAN:    1.  Annual physical exam  All care gaps identified and addressed  We will try to obtain record of his second Shingrix injection  Counseled patient on the importance of updating his COVID-19 vaccine booster #2. He plans to wait for the updated bivalent mRNA vaccines  Due for pneumococcal vaccination  - EKG 12 Lead  - CBC with Auto Differential; Future  - Comprehensive Metabolic Panel; Future  - Hemoglobin A1C; Future  - Lipid Panel; Future  - TSH; Future  - PSA Screening; Future  - Vitamin D 25 Hydroxy; Future    2. Need for pneumococcal vaccination    - Pneumococcal, PCV20, PREVNAR 21, (age 25 yrs+), IM, PF    3. Type 2 diabetes mellitus without complication, without long-term current use of insulin (HCC)  Condition control is uncertain  Due for Z9Z  Check U. MALB/Cr   - Microalbumin / Creatinine Urine Ratio  - CBC with Auto Differential; Future  - Comprehensive Metabolic Panel; Future  - Hemoglobin A1C; Future  - Lipid Panel; Future  - TSH; Future    4. Vitamin D deficiency    - Vitamin D 25 Hydroxy; Future    5. Hyperlipidemia, unspecified hyperlipidemia type    - Comprehensive Metabolic Panel; Future  - Lipid Panel; Future    6. Screening for prostate cancer    - PSA Screening;  Future                 Preventive plan of care for Zeenat Hebert        8/23/2022           Preventive Measures Status       Recommendations for screening   Prostate Cancer Screen  Lab Results   Component Value Date    PSA 0.86 08/19/2021     Test recommended and ordered    Colon Cancer Screen  Last colonoscopy: 4/15/14  Repeat colonoscopy every 10 years-- Due 2024   Diabetes Screen  Glucose (mg/dL)   Date Value   06/01/2022 108 (H)    Test recommended and ordered   Cholesterol Screen  Lab Results   Component Value Date    CHOL 223 (H) 02/17/2022    TRIG 102 02/17/2022    HDL 66 (H) 02/17/2022    LDLCALC 137 (H) 02/17/2022    Test recommended and ordered   HIV screening recommended for those ages 12-76 NO MATTER THE RISK FOR HIV--  3/14/2017  No need to repeat at this time    Hepatitis C screening recommended for those born between [facility]-- 3/14/2017  No need to repeat at this time    Aspirin for Cardiovascular Prevention   Yes Continue Baby Aspirin Daily     Recommended Immunizations    Immunization History   Administered Date(s) Administered    COVID-19, MODERNA BLUE border, Primary or Immunocompromised, (age 12y+), IM, 100 mcg/0.5mL 03/19/2021, 04/16/2021, 12/15/2021    Hepatitis B Adult (Engerix-B) 08/19/2021, 09/20/2021, 02/17/2022    Influenza Virus Vaccine 02/09/2016, 12/13/2017    Influenza, FLUARIX, FLULAVAL, (age 10 mo+) AND AFLURIA, FLUZONE (age 1 y+), PF 10/15/2018    Pneumococcal Polysaccharide (Agpwveclw13) 05/10/2018    Pneumococcal conjugate PCV20, PF (Prevnar 20) 08/23/2022    Tdap (Boostrix, Adacel) 01/29/2013    Zoster Live (Zostavax) 12/19/2018    Zoster Recombinant (Shingrix) 02/27/2019    Influenza vaccine: recommended every fall    Pneumonia vaccine: Prevnar 20 due. Shingles vaccine: Shingrix is completed. Need dates of injections. Tetanus vaccine: tetanus and diptheria vaccine (Td/Tdap) recommended every 10 years- Td due 2023              1. Use sunscreen daily to help reduce the risk of skin cancer. Follow-up with a dermatologist.  2.  Update your diabetic retinal eye exam every year. 3. Always wear a seatbelt while in a car  4. Wear a helmet if riding a bike or motorcycle  5. Continue Lifestyle modification including low calorie diet focusing on Low fat/low cholesterol and low carbohydrate intake, along with  increasing cardiovascular (aerobic) exercise. Here are a few  Reliable websites with a variety of health and wellness information:   www.mylifecheck. heart. org     www.nutritionsource. org     www. americanheart. org     www. diabetes. org     [email or web address]     [email or web address])

## 2022-11-14 ENCOUNTER — TELEPHONE (OUTPATIENT)
Dept: INTERNAL MEDICINE CLINIC | Age: 57
End: 2022-11-14

## 2022-11-14 NOTE — TELEPHONE ENCOUNTER
----- Message from Taiwo Karindonte sent at 11/14/2022 10:24 AM EST -----  Subject: Message to Provider    QUESTIONS  Information for Provider? Patient is calling and states that the   government has sent paperwork to the office. This was at the end of   September. This has to do with his [employer] records. The are requesting   medical records for patient. They have not received the paperwork back   yet. Please call to advise. Says they sent form to office. Not to [facility].   ---------------------------------------------------------------------------  --------------  Jewell MCGRAW  [phone]; OK to leave message on voicemail  ---------------------------------------------------------------------------  --------------  SCRIPT ANSWERS  Relationship to Patient?  Self

## 2023-01-04 ENCOUNTER — TELEPHONE (OUTPATIENT)
Dept: INTERNAL MEDICINE CLINIC | Age: 58
End: 2023-01-04

## 2023-01-04 NOTE — TELEPHONE ENCOUNTER
Patient was sent to [facility] last summer for x-rays for his  back. Hospital did incorrect x-rays and Dr. Noreen Hunter is aware  of this. Sabino Zhou sent him to get x-rays at facility across the street from [facility] instead. Patient got and paid bill  that he received from incorrect x-rays done at [facility]. Is there a way for us to clarify that [facility] did incorrect x-rays despite Sabino Zhou telling the ER doctor  exactly what he was wanting to have done x-ray wise? Please call patient to  see if we can get this corrected/reimbursed? Also  he just received the bill in December and it was done in May? I explained I would send to Dr. Noreen Hunter who may forward on to [facility] to address.

## 2023-01-09 NOTE — TELEPHONE ENCOUNTER
Spoke with patient who states Dr. Thomas Hinkle ordered the x-ray that needed to be done on 5/21 & spoke with ER but the hospital did the wrong x-ray. He does not feel he should pay for both x-rays. Do you recall conversation with the ER on this patient? Did you instruct them specifically, on what patient needed?

## 2023-01-10 NOTE — TELEPHONE ENCOUNTER
Discussion with the patient. The patient expressed the fact that he was upset about his ER experience, having to wait 11 hours in the ER. He is also upset about the bills that he got and the fact that they did not complete the test that I had wanted. They actually did a good job ordering a CAT scan and chest x-ray. They just did not order the rib x-rays. He did end up getting these rib x-rays. Nonetheless, he is extremely upset and I advised that he go through the proper channels to express his concerns. Patient states that \"the next time I have to go to the ER, I will tell you where I want to go. \"

## 2023-08-30 ENCOUNTER — OFFICE VISIT (OUTPATIENT)
Dept: INTERNAL MEDICINE CLINIC | Age: 58
End: 2023-08-30
Payer: COMMERCIAL

## 2023-08-30 VITALS
WEIGHT: 315 LBS | DIASTOLIC BLOOD PRESSURE: 88 MMHG | RESPIRATION RATE: 14 BRPM | HEIGHT: 73 IN | BODY MASS INDEX: 41.75 KG/M2 | HEART RATE: 92 BPM | OXYGEN SATURATION: 90 % | SYSTOLIC BLOOD PRESSURE: 136 MMHG

## 2023-08-30 DIAGNOSIS — Z23 NEED FOR TDAP VACCINATION: ICD-10-CM

## 2023-08-30 DIAGNOSIS — E55.9 VITAMIN D DEFICIENCY: ICD-10-CM

## 2023-08-30 DIAGNOSIS — E66.01 CLASS 3 SEVERE OBESITY DUE TO EXCESS CALORIES WITH SERIOUS COMORBIDITY AND BODY MASS INDEX (BMI) OF 40.0 TO 44.9 IN ADULT (HCC): ICD-10-CM

## 2023-08-30 DIAGNOSIS — Z00.00 ANNUAL PHYSICAL EXAM: Primary | ICD-10-CM

## 2023-08-30 DIAGNOSIS — E78.5 HYPERLIPIDEMIA, UNSPECIFIED HYPERLIPIDEMIA TYPE: ICD-10-CM

## 2023-08-30 DIAGNOSIS — E11.9 TYPE 2 DIABETES MELLITUS WITHOUT COMPLICATION, WITHOUT LONG-TERM CURRENT USE OF INSULIN (HCC): ICD-10-CM

## 2023-08-30 DIAGNOSIS — Z12.5 SCREENING FOR PROSTATE CANCER: ICD-10-CM

## 2023-08-30 PROCEDURE — 90715 TDAP VACCINE 7 YRS/> IM: CPT | Performed by: INTERNAL MEDICINE

## 2023-08-30 PROCEDURE — 99396 PREV VISIT EST AGE 40-64: CPT | Performed by: INTERNAL MEDICINE

## 2023-08-30 PROCEDURE — 90471 IMMUNIZATION ADMIN: CPT | Performed by: INTERNAL MEDICINE

## 2023-08-30 PROCEDURE — 93000 ELECTROCARDIOGRAM COMPLETE: CPT | Performed by: INTERNAL MEDICINE

## 2023-08-30 SDOH — ECONOMIC STABILITY: FOOD INSECURITY: WITHIN THE PAST 12 MONTHS, YOU WORRIED THAT YOUR FOOD WOULD RUN OUT BEFORE YOU GOT MONEY TO BUY MORE.: NEVER TRUE

## 2023-08-30 SDOH — ECONOMIC STABILITY: FOOD INSECURITY: WITHIN THE PAST 12 MONTHS, THE FOOD YOU BOUGHT JUST DIDN'T LAST AND YOU DIDN'T HAVE MONEY TO GET MORE.: NEVER TRUE

## 2023-08-30 SDOH — ECONOMIC STABILITY: INCOME INSECURITY: HOW HARD IS IT FOR YOU TO PAY FOR THE VERY BASICS LIKE FOOD, HOUSING, MEDICAL CARE, AND HEATING?: NOT HARD AT ALL

## 2023-08-30 ASSESSMENT — PATIENT HEALTH QUESTIONNAIRE - PHQ9
SUM OF ALL RESPONSES TO PHQ QUESTIONS 1-9: 0
SUM OF ALL RESPONSES TO PHQ QUESTIONS 1-9: 0
SUM OF ALL RESPONSES TO PHQ9 QUESTIONS 1 & 2: 0
1. LITTLE INTEREST OR PLEASURE IN DOING THINGS: 0
1. LITTLE INTEREST OR PLEASURE IN DOING THINGS: NOT AT ALL
2. FEELING DOWN, DEPRESSED OR HOPELESS: 0
2. FEELING DOWN, DEPRESSED OR HOPELESS: NOT AT ALL
SUM OF ALL RESPONSES TO PHQ QUESTIONS 1-9: 0
SUM OF ALL RESPONSES TO PHQ9 QUESTIONS 1 & 2: 0
SUM OF ALL RESPONSES TO PHQ QUESTIONS 1-9: 0

## 2023-08-30 NOTE — PATIENT INSTRUCTIONS
Preventive plan of care for Susan Andrade        8/30/2023           Preventive Measures Status       Recommendations for screening   Prostate Cancer Screen  Lab Results   Component Value Date    PSA 0.91 08/23/2022     Test recommended and ordered    Colon Cancer Screen  Last colonoscopy: 4/15/14  Repeat colonoscopy every 10 years-- Due 2024   Diabetes Screen  Glucose (mg/dL)   Date Value   08/23/2022 122 (H)    Overdue test recommended and ordered   Cholesterol Screen  Lab Results   Component Value Date    CHOL 202 (H) 08/23/2022    TRIG 74 08/23/2022    HDL 73 (H) 08/23/2022    LDLCALC 114 (H) 08/23/2022    Overdue. Test recommended and ordered   HIV screening recommended for those ages 14-79 NO MATTER THE RISK FOR HIV--  3/14/2017  No need to repeat at this time    Hepatitis C screening recommended for those born between 1945 and 1965-- 3/14/2017  No need to repeat at this time    Aspirin for Cardiovascular Prevention   Yes Continue Baby Aspirin Daily     Recommended Immunizations    Immunization History   Administered Date(s) Administered    COVID-19, MODERNA BLUE border, Primary or Immunocompromised, (age 12y+), IM, 100 mcg/0.5mL 03/19/2021, 04/16/2021, 12/15/2021    COVID-19, MODERNA Bivalent, (age 12y+), IM, 50 mcg/0.5 mL 12/29/2022    Hep B, ENGERIX-B, (age 20y+), IM, 1mL 08/19/2021, 09/20/2021, 02/17/2022    Influenza Virus Vaccine 02/09/2016, 12/13/2017    Influenza, FLUARIX, FLULAVAL, FLUZONE (age 10 mo+) AND AFLURIA, (age 1 y+), PF, 0.5mL 10/15/2018    Pneumococcal, PCV20, PREVNAR 21, (age 18y+), IM, 0.5mL 08/23/2022    Pneumococcal, PPSV23, PNEUMOVAX 21, (age 2y+), SC/IM, 0.5mL 05/10/2018    TDaP, ADACEL (age 6y-58y), BOOSTRIX (age 10y+), IM, 0.5mL 01/29/2013    Zoster Live (Zostavax) 12/19/2018    Zoster Recombinant (Shingrix) 02/27/2019    Influenza vaccine: recommended every fall    Pneumonia vaccine: Series complete     Shingles vaccine: Shingrix is completed.   We do not have record of the

## 2023-08-30 NOTE — PROGRESS NOTES
muscle groups. No sensory deficits. No focal or lateralizing deficits. Reflexes 2/4 and symmetric. Gait is normal.  Monofilament and vibratory sensation intact in both feet  MS:  No C/T/L paraspinal tenderness. No scoliosis. No joint effusions. Full joint ROM. PSYCH:  Mood and affect NL. Judgement and insight NL.          ASSESSMENT/PLAN:    1. Annual physical exam  All care gaps identified and addressed  Recommend flu vaccine when available  Recommend COVID-19 vaccine booster when available  Tdap booster today  PSA for prostate cancer screening  Colonoscopy for colon cancer screening is up-to-date  - EKG 12 Lead  - CBC with Auto Differential; Future  - Comprehensive Metabolic Panel; Future  - Lipid Panel; Future  - TSH; Future  - Hemoglobin A1C; Future  - PSA Screening; Future  - Vitamin D 25 Hydroxy; Future    2. Type 2 diabetes mellitus without complication, without long-term current use of insulin (HCC)  Urine test today  Obtain diabetic eye exam report  -  DIABETES FOOT EXAM  - CBC with Auto Differential; Future  - Comprehensive Metabolic Panel; Future  - Lipid Panel; Future  - TSH; Future  - Hemoglobin A1C; Future    3. Hyperlipidemia, unspecified hyperlipidemia type    - Comprehensive Metabolic Panel; Future  - Lipid Panel; Future  - TSH; Future    4. Screening for prostate cancer    - PSA Screening; Future    5. Class 3 severe obesity due to excess calories with serious comorbidity and body mass index (BMI) of 40.0 to 44.9 in adult [facility])  Condition is uncontrolled  Counseled on lifestyle modification strategies. We discussed a carb restricted and calorie restricted diet  Recommend increasing cardio    6. Vitamin D deficiency    - Vitamin D 25 Hydroxy;  Future               Preventive plan of care for Yamile Sandoval        8/30/2023           Preventive Measures Status       Recommendations for screening   Prostate Cancer Screen  Lab Results   Component Value Date    PSA 0.91 08/23/2022     Test

## 2023-08-31 LAB
CREAT UR-MCNC: 164 MG/DL (ref 39–259)
MICROALBUMIN UR DL<=1MG/L-MCNC: <1.2 MG/DL
MICROALBUMIN/CREAT UR: NORMAL MG/G (ref 0–30)

## 2024-10-02 ENCOUNTER — OFFICE VISIT (OUTPATIENT)
Dept: INTERNAL MEDICINE CLINIC | Age: 59
End: 2024-10-02

## 2024-10-02 VITALS
WEIGHT: 310 LBS | SYSTOLIC BLOOD PRESSURE: 138 MMHG | HEART RATE: 63 BPM | DIASTOLIC BLOOD PRESSURE: 80 MMHG | OXYGEN SATURATION: 98 % | BODY MASS INDEX: 41.99 KG/M2 | HEIGHT: 72 IN

## 2024-10-02 DIAGNOSIS — E55.9 VITAMIN D DEFICIENCY: ICD-10-CM

## 2024-10-02 DIAGNOSIS — E11.9 TYPE 2 DIABETES MELLITUS WITHOUT COMPLICATION, WITHOUT LONG-TERM CURRENT USE OF INSULIN (HCC): ICD-10-CM

## 2024-10-02 DIAGNOSIS — E78.5 HYPERLIPIDEMIA, UNSPECIFIED HYPERLIPIDEMIA TYPE: ICD-10-CM

## 2024-10-02 DIAGNOSIS — Z12.5 SCREENING FOR PROSTATE CANCER: ICD-10-CM

## 2024-10-02 DIAGNOSIS — Z00.00 ANNUAL PHYSICAL EXAM: ICD-10-CM

## 2024-10-02 DIAGNOSIS — Z00.00 ANNUAL PHYSICAL EXAM: Primary | ICD-10-CM

## 2024-10-02 DIAGNOSIS — E66.813 CLASS 3 SEVERE OBESITY DUE TO EXCESS CALORIES WITH SERIOUS COMORBIDITY AND BODY MASS INDEX (BMI) OF 40.0 TO 44.9 IN ADULT: ICD-10-CM

## 2024-10-02 DIAGNOSIS — Z23 NEED FOR VACCINATION: ICD-10-CM

## 2024-10-02 DIAGNOSIS — E66.01 CLASS 3 SEVERE OBESITY DUE TO EXCESS CALORIES WITH SERIOUS COMORBIDITY AND BODY MASS INDEX (BMI) OF 40.0 TO 44.9 IN ADULT: ICD-10-CM

## 2024-10-02 SDOH — ECONOMIC STABILITY: INCOME INSECURITY: HOW HARD IS IT FOR YOU TO PAY FOR THE VERY BASICS LIKE FOOD, HOUSING, MEDICAL CARE, AND HEATING?: NOT VERY HARD

## 2024-10-02 SDOH — ECONOMIC STABILITY: FOOD INSECURITY: WITHIN THE PAST 12 MONTHS, YOU WORRIED THAT YOUR FOOD WOULD RUN OUT BEFORE YOU GOT MONEY TO BUY MORE.: NEVER TRUE

## 2024-10-02 SDOH — ECONOMIC STABILITY: FOOD INSECURITY: WITHIN THE PAST 12 MONTHS, THE FOOD YOU BOUGHT JUST DIDN'T LAST AND YOU DIDN'T HAVE MONEY TO GET MORE.: NEVER TRUE

## 2024-10-02 ASSESSMENT — PATIENT HEALTH QUESTIONNAIRE - PHQ9
1. LITTLE INTEREST OR PLEASURE IN DOING THINGS: NOT AT ALL
SUM OF ALL RESPONSES TO PHQ QUESTIONS 1-9: 0
1. LITTLE INTEREST OR PLEASURE IN DOING THINGS: NOT AT ALL
2. FEELING DOWN, DEPRESSED OR HOPELESS: NOT AT ALL
SUM OF ALL RESPONSES TO PHQ QUESTIONS 1-9: 0
SUM OF ALL RESPONSES TO PHQ9 QUESTIONS 1 & 2: 0
SUM OF ALL RESPONSES TO PHQ QUESTIONS 1-9: 0
SUM OF ALL RESPONSES TO PHQ QUESTIONS 1-9: 0
SUM OF ALL RESPONSES TO PHQ9 QUESTIONS 1 & 2: 0
2. FEELING DOWN, DEPRESSED OR HOPELESS: NOT AT ALL

## 2024-10-02 NOTE — PROGRESS NOTES
[facility]  Internal Medicine  Patient Encounter  Walker Barron D.O., [facility]        Annual Preventative Physical    Chief Complaint   Patient presents with    Annual Exam    Immunizations     Flu vaccine       HPI-- 58 y.o. male who has not been seen since August 2023, over a year ago, presents today requesting an annual preventative history and physical.  He states he has been feeling well.  He offers no new concerns    He lost his sister to metastatic breast cancer      Medical/Surgical Histories     Past Medical History:   Diagnosis Date    DDD (degenerative disc disease), lumbar 06/2022    DDD (degenerative disc disease), thoracic 06/2022    HNP (herniated nucleus pulposus), cervical 2009    Dr. Brittni Patel    Impaired fasting glucose     Lumbar foraminal stenosis 06/2022    Lumbar spondylosis 06/2022    [facility]     Dr. Cummings    Spinal stenosis of lumbar region without neurogenic claudication 06/2022    Thoracic radiculopathy 08/23/2022    Type 2 diabetes mellitus without complication, without long-term current use of insulin ([facility]) 05/10/2018    Vitamin D deficiency 08/23/2022          No past surgical history on file.        Medications/Allergies     Prior to Visit Medications    Medication Sig   Multiple Vitamin (MULTIVITAMIN ADULT PO) Take by mouth daily   aspirin EC 81 MG EC tablet Take 1 tablet by mouth daily          No Known Allergies      Substance Use History     Social History     Tobacco Use    Smoking status: Never    Smokeless tobacco: Never   Vaping Use    Vaping status: Never Used   Substance Use Topics    Alcohol use: No    Drug use: No      [employer]    Family History     Family History   Problem Relation Age of Onset    High Blood Pressure Mother     Diabetes Mother     Diabetes Father     Heart Disease Father 66        CAD, CABG    High Blood Pressure Father     Breast Cancer Sister 71    Kidney Disease Brother         stone    High

## 2024-10-02 NOTE — PATIENT INSTRUCTIONS
Preventive plan of care for Ned Chandler        10/2/2024           Preventive Measures Status       Recommendations for screening   Prostate Cancer Screen  Lab Results   Component Value Date    PSA 0.89 08/30/2023     Test recommended and ordered    Colon Cancer Screen  Last colonoscopy: 7/29/2024 Repeat colonoscopy every 5 years due to the presence of a tubular adenomatous colon polyp.  Next colonoscopy due in July 2029   Diabetes Screen  Glucose (mg/dL)   Date Value   08/30/2023 117 (H)    Lab testing overdue.  Ordered   Cholesterol Screen  Lab Results   Component Value Date    CHOL 200 (H) 08/30/2023    TRIG 95 08/30/2023    HDL 64 (H) 08/30/2023    Lab testing overdue ordered   HIV screening recommended for those ages 15-65 NO MATTER THE RISK FOR HIV--  3/14/2017  No need to repeat at this time    Hepatitis C screening recommended for those born between 1945 and 1965-- 3/14/2017  No need to repeat at this time    Aspirin for Cardiovascular Prevention   Yes Continue Baby Aspirin Daily     Recommended Immunizations    Immunization History   Administered Date(s) Administered    COVID-19, MODERNA BLUE border, Primary or Immunocompromised, (age 12y+), IM, 100 mcg/0.5mL 03/19/2021, 04/16/2021, 12/15/2021    COVID-19, MODERNA Bivalent, (age 12y+), IM, 50 mcg/0.5 mL 12/29/2022    Hep B, ENGERIX-B, (age 20y+), IM, 1mL 08/19/2021, 09/20/2021, 02/17/2022    Influenza Virus Vaccine 02/09/2016, 12/13/2017    Influenza, FLUARIX, FLULAVAL, FLUZONE (age 6 mo+) and AFLURIA, (age 3 y+), Quadv PF, 0.5mL 10/15/2018    Influenza, FLUCELVAX, (age 6 mo+) IM, Trivalent PF, 0.5mL 10/02/2024    Pneumococcal, PCV20, PREVNAR 20, (age 6w+), IM, 0.5mL 08/23/2022    Pneumococcal, PPSV23, PNEUMOVAX 23, (age 2y+), SC/IM, 0.5mL 05/10/2018    TDaP, ADACEL (age 10y-64y), BOOSTRIX (age 10y+), IM, 0.5mL 01/29/2013, 08/30/2023    Zoster Live (Zostavax) 12/19/2018    Zoster Recombinant (Shingrix) 02/27/2019    Influenza vaccine: recommended

## 2024-10-03 LAB
25(OH)D3 SERPL-MCNC: 13.6 NG/ML
ALBUMIN SERPL-MCNC: 4.4 G/DL (ref 3.4–5)
ALBUMIN/GLOB SERPL: 1.7 {RATIO} (ref 1.1–2.2)
ALP SERPL-CCNC: 83 U/L (ref 40–129)
ALT SERPL-CCNC: 18 U/L (ref 10–40)
ANION GAP SERPL CALCULATED.3IONS-SCNC: 11 MMOL/L (ref 3–16)
AST SERPL-CCNC: 16 U/L (ref 15–37)
BASOPHILS # BLD: 0 K/UL (ref 0–0.2)
BASOPHILS NFR BLD: 0.5 %
BILIRUB SERPL-MCNC: 0.5 MG/DL (ref 0–1)
BUN SERPL-MCNC: 13 MG/DL (ref 7–20)
CALCIUM SERPL-MCNC: 10 MG/DL (ref 8.3–10.6)
CHLORIDE SERPL-SCNC: 102 MMOL/L (ref 99–110)
CHOLEST SERPL-MCNC: 222 MG/DL (ref 0–199)
CO2 SERPL-SCNC: 28 MMOL/L (ref 21–32)
CREAT SERPL-MCNC: 1.2 MG/DL (ref 0.9–1.3)
CREAT UR-MCNC: 147 MG/DL (ref 39–259)
DEPRECATED RDW RBC AUTO: 14.9 % (ref 12.4–15.4)
EOSINOPHIL # BLD: 0.1 K/UL (ref 0–0.6)
EOSINOPHIL NFR BLD: 2.2 %
EST. AVERAGE GLUCOSE BLD GHB EST-MCNC: 148.5 MG/DL
GFR SERPLBLD CREATININE-BSD FMLA CKD-EPI: 69 ML/MIN/{1.73_M2}
GLUCOSE SERPL-MCNC: 112 MG/DL (ref 70–99)
HBA1C MFR BLD: 6.8 %
HCT VFR BLD AUTO: 46.9 % (ref 40.5–52.5)
HDLC SERPL-MCNC: 64 MG/DL (ref 40–60)
HGB BLD-MCNC: 15.2 G/DL (ref 13.5–17.5)
LDLC SERPL CALC-MCNC: 137 MG/DL
LYMPHOCYTES # BLD: 1.7 K/UL (ref 1–5.1)
LYMPHOCYTES NFR BLD: 32.6 %
MCH RBC QN AUTO: 26.2 PG (ref 26–34)
MCHC RBC AUTO-ENTMCNC: 32.4 G/DL (ref 31–36)
MCV RBC AUTO: 81.1 FL (ref 80–100)
MICROALBUMIN UR DL<=1MG/L-MCNC: <1.2 MG/DL
MICROALBUMIN/CREAT UR: NORMAL MG/G (ref 0–30)
MONOCYTES # BLD: 0.5 K/UL (ref 0–1.3)
MONOCYTES NFR BLD: 9.6 %
NEUTROPHILS # BLD: 2.8 K/UL (ref 1.7–7.7)
NEUTROPHILS NFR BLD: 55.1 %
PLATELET # BLD AUTO: 250 K/UL (ref 135–450)
PMV BLD AUTO: 8.3 FL (ref 5–10.5)
POTASSIUM SERPL-SCNC: 4.1 MMOL/L (ref 3.5–5.1)
PROT SERPL-MCNC: 7 G/DL (ref 6.4–8.2)
PSA SERPL DL<=0.01 NG/ML-MCNC: 1.43 NG/ML (ref 0–4)
RBC # BLD AUTO: 5.79 M/UL (ref 4.2–5.9)
SODIUM SERPL-SCNC: 141 MMOL/L (ref 136–145)
TRIGL SERPL-MCNC: 103 MG/DL (ref 0–150)
TSH SERPL DL<=0.005 MIU/L-ACNC: 0.44 UIU/ML (ref 0.27–4.2)
VLDLC SERPL CALC-MCNC: 21 MG/DL
WBC # BLD AUTO: 5.2 K/UL (ref 4–11)